# Patient Record
Sex: FEMALE | Race: WHITE | Employment: OTHER | URBAN - METROPOLITAN AREA
[De-identification: names, ages, dates, MRNs, and addresses within clinical notes are randomized per-mention and may not be internally consistent; named-entity substitution may affect disease eponyms.]

---

## 2017-02-16 ENCOUNTER — APPOINTMENT (OUTPATIENT)
Dept: GENERAL RADIOLOGY | Age: 82
End: 2017-02-16
Attending: EMERGENCY MEDICINE
Payer: MEDICARE

## 2017-02-16 ENCOUNTER — HOSPITAL ENCOUNTER (EMERGENCY)
Age: 82
Discharge: HOME | End: 2017-02-16
Attending: EMERGENCY MEDICINE | Admitting: EMERGENCY MEDICINE
Payer: MEDICARE

## 2017-02-16 VITALS
DIASTOLIC BLOOD PRESSURE: 61 MMHG | SYSTOLIC BLOOD PRESSURE: 129 MMHG | OXYGEN SATURATION: 94 % | BODY MASS INDEX: 27.42 KG/M2 | RESPIRATION RATE: 19 BRPM | TEMPERATURE: 98.7 F | WEIGHT: 154.76 LBS | HEART RATE: 77 BPM

## 2017-02-16 DIAGNOSIS — J18.9 CAP (COMMUNITY ACQUIRED PNEUMONIA): Primary | ICD-10-CM

## 2017-02-16 DIAGNOSIS — R55 SYNCOPE AND COLLAPSE: ICD-10-CM

## 2017-02-16 LAB
ALBUMIN SERPL BCP-MCNC: 3.2 G/DL (ref 3.4–5)
ALBUMIN/GLOB SERPL: 0.8 {RATIO} (ref 0.8–1.7)
ALP SERPL-CCNC: 92 U/L (ref 45–117)
ALT SERPL-CCNC: 23 U/L (ref 13–56)
ANION GAP BLD CALC-SCNC: 12 MMOL/L (ref 3–18)
AST SERPL W P-5'-P-CCNC: 36 U/L (ref 15–37)
ATRIAL RATE: 79 BPM
BASOPHILS # BLD AUTO: 0 K/UL (ref 0–0.1)
BASOPHILS # BLD: 0 % (ref 0–2)
BILIRUB SERPL-MCNC: 1.6 MG/DL (ref 0.2–1)
BUN SERPL-MCNC: 18 MG/DL (ref 7–18)
BUN/CREAT SERPL: 18 (ref 12–20)
CALCIUM SERPL-MCNC: 8.7 MG/DL (ref 8.5–10.1)
CALCULATED P AXIS, ECG09: 67 DEGREES
CALCULATED R AXIS, ECG10: -60 DEGREES
CALCULATED T AXIS, ECG11: -30 DEGREES
CHLORIDE SERPL-SCNC: 97 MMOL/L (ref 100–108)
CK MB CFR SERPL CALC: 0.9 % (ref 0–4)
CK MB SERPL-MCNC: 2.2 NG/ML (ref 0.5–3.6)
CK SERPL-CCNC: 249 U/L (ref 26–192)
CO2 SERPL-SCNC: 27 MMOL/L (ref 21–32)
CREAT SERPL-MCNC: 0.99 MG/DL (ref 0.6–1.3)
DIAGNOSIS, 93000: NORMAL
DIFFERENTIAL METHOD BLD: ABNORMAL
EOSINOPHIL # BLD: 0 K/UL (ref 0–0.4)
EOSINOPHIL NFR BLD: 0 % (ref 0–5)
ERYTHROCYTE [DISTWIDTH] IN BLOOD BY AUTOMATED COUNT: 13 % (ref 11.6–14.5)
GLOBULIN SER CALC-MCNC: 3.8 G/DL (ref 2–4)
GLUCOSE SERPL-MCNC: 120 MG/DL (ref 74–99)
HCT VFR BLD AUTO: 39.8 % (ref 35–45)
HGB BLD-MCNC: 13.7 G/DL (ref 12–16)
LYMPHOCYTES # BLD AUTO: 12 % (ref 21–52)
LYMPHOCYTES # BLD: 1.4 K/UL (ref 0.9–3.6)
MCH RBC QN AUTO: 31.9 PG (ref 24–34)
MCHC RBC AUTO-ENTMCNC: 34.4 G/DL (ref 31–37)
MCV RBC AUTO: 92.6 FL (ref 74–97)
MONOCYTES # BLD: 0.7 K/UL (ref 0.05–1.2)
MONOCYTES NFR BLD AUTO: 6 % (ref 3–10)
NEUTS SEG # BLD: 9.7 K/UL (ref 1.8–8)
NEUTS SEG NFR BLD AUTO: 82 % (ref 40–73)
P-R INTERVAL, ECG05: 156 MS
PLATELET # BLD AUTO: 181 K/UL (ref 135–420)
PMV BLD AUTO: 10 FL (ref 9.2–11.8)
POTASSIUM SERPL-SCNC: 3.3 MMOL/L (ref 3.5–5.5)
PROT SERPL-MCNC: 7 G/DL (ref 6.4–8.2)
Q-T INTERVAL, ECG07: 402 MS
QRS DURATION, ECG06: 118 MS
QTC CALCULATION (BEZET), ECG08: 460 MS
RBC # BLD AUTO: 4.3 M/UL (ref 4.2–5.3)
SODIUM SERPL-SCNC: 136 MMOL/L (ref 136–145)
TROPONIN I SERPL-MCNC: <0.02 NG/ML (ref 0–0.04)
VENTRICULAR RATE, ECG03: 79 BPM
WBC # BLD AUTO: 11.9 K/UL (ref 4.6–13.2)

## 2017-02-16 PROCEDURE — 82550 ASSAY OF CK (CPK): CPT | Performed by: EMERGENCY MEDICINE

## 2017-02-16 PROCEDURE — 85025 COMPLETE CBC W/AUTO DIFF WBC: CPT | Performed by: EMERGENCY MEDICINE

## 2017-02-16 PROCEDURE — 74011250637 HC RX REV CODE- 250/637: Performed by: EMERGENCY MEDICINE

## 2017-02-16 PROCEDURE — 74011250636 HC RX REV CODE- 250/636: Performed by: EMERGENCY MEDICINE

## 2017-02-16 PROCEDURE — 80053 COMPREHEN METABOLIC PANEL: CPT | Performed by: EMERGENCY MEDICINE

## 2017-02-16 PROCEDURE — 96361 HYDRATE IV INFUSION ADD-ON: CPT

## 2017-02-16 PROCEDURE — 96360 HYDRATION IV INFUSION INIT: CPT

## 2017-02-16 PROCEDURE — 93005 ELECTROCARDIOGRAM TRACING: CPT

## 2017-02-16 PROCEDURE — 71010 XR CHEST PORT: CPT

## 2017-02-16 PROCEDURE — 99285 EMERGENCY DEPT VISIT HI MDM: CPT

## 2017-02-16 RX ORDER — AZITHROMYCIN 250 MG/1
TABLET, FILM COATED ORAL
Qty: 4 TAB | Refills: 0 | Status: SHIPPED | OUTPATIENT
Start: 2017-02-16 | End: 2017-12-12 | Stop reason: ALTCHOICE

## 2017-02-16 RX ORDER — SODIUM CHLORIDE 9 MG/ML
1000 INJECTION, SOLUTION INTRAVENOUS ONCE
Status: COMPLETED | OUTPATIENT
Start: 2017-02-16 | End: 2017-02-16

## 2017-02-16 RX ORDER — AZITHROMYCIN 250 MG/1
500 TABLET, FILM COATED ORAL
Status: COMPLETED | OUTPATIENT
Start: 2017-02-16 | End: 2017-02-16

## 2017-02-16 RX ADMIN — AZITHROMYCIN 500 MG: 250 TABLET, FILM COATED ORAL at 16:05

## 2017-02-16 RX ADMIN — SODIUM CHLORIDE 1000 ML: 9 INJECTION, SOLUTION INTRAVENOUS at 14:10

## 2017-02-16 NOTE — ED NOTES
Discharge Instructions reviewed with patient/family. Patient/family  states understanding. . Condition stable. Opportunity for questions provided. E-sign not available  Hard copy instructions signed.    Pt states verbal understanding of instructions

## 2017-02-16 NOTE — DISCHARGE INSTRUCTIONS
Pneumonia: Care Instructions  Your Care Instructions    Pneumonia is an infection of the lungs. Most cases are caused by infections from bacteria or viruses. Pneumonia may be mild or very severe. If it is caused by bacteria, you will be treated with antibiotics. It may take a few weeks to a few months to recover fully from pneumonia, depending on how sick you were and whether your overall health is good. Follow-up care is a key part of your treatment and safety. Be sure to make and go to all appointments, and call your doctor if you are having problems. Its also a good idea to know your test results and keep a list of the medicines you take. How can you care for yourself at home? · Take your antibiotics exactly as directed. Do not stop taking the medicine just because you are feeling better. You need to take the full course of antibiotics. · Take your medicines exactly as prescribed. Call your doctor if you think you are having a problem with your medicine. · Get plenty of rest and sleep. You may feel weak and tired for a while, but your energy level will improve with time. · To prevent dehydration, drink plenty of fluids, enough so that your urine is light yellow or clear like water. Choose water and other caffeine-free clear liquids until you feel better. If you have kidney, heart, or liver disease and have to limit fluids, talk with your doctor before you increase the amount of fluids you drink. · Take care of your cough so you can rest. A cough that brings up mucus from your lungs is common with pneumonia. It is one way your body gets rid of the infection. But if coughing keeps you from resting or causes severe fatigue and chest-wall pain, talk to your doctor. He or she may suggest that you take a medicine to reduce the cough. · Use a vaporizer or humidifier to add moisture to your bedroom. Follow the directions for cleaning the machine. · Do not smoke or allow others to smoke around you.  Smoke will make your cough last longer. If you need help quitting, talk to your doctor about stop-smoking programs and medicines. These can increase your chances of quitting for good. · Take an over-the-counter pain medicine, such as acetaminophen (Tylenol), ibuprofen (Advil, Motrin), or naproxen (Aleve). Read and follow all instructions on the label. · Do not take two or more pain medicines at the same time unless the doctor told you to. Many pain medicines have acetaminophen, which is Tylenol. Too much acetaminophen (Tylenol) can be harmful. · If you were given a spirometer to measure how well your lungs are working, use it as instructed. This can help your doctor tell how your recovery is going. · To prevent pneumonia in the future, talk to your doctor about getting a flu vaccine (once a year) and a pneumococcal vaccine (one time only for most people). When should you call for help? Call 911 anytime you think you may need emergency care. For example, call if:  · You have severe trouble breathing. Call your doctor now or seek immediate medical care if:  · You cough up dark brown or bloody mucus (sputum). · You have new or worse trouble breathing. · You are dizzy or lightheaded, or you feel like you may faint. Watch closely for changes in your health, and be sure to contact your doctor if:  · You have a new or higher fever. · You are coughing more deeply or more often. · You are not getting better after 2 days (48 hours). · You do not get better as expected. Where can you learn more? Go to http://jonnathan-maris.info/. Enter [id number] in the search box to learn more about \"Pneumonia: Care Instructions. \"  Current as of: May 23, 2016  Content Version: 11.1  © 3719-8477 [employer]. Care instructions adapted under license by [employer] (which disclaims liability or warranty for this information).  If you have questions about a medical condition or this instruction, always ask your healthcare professional. [place] [address] any warranty or liability for your use of this information. Fainting: Care Instructions  Your Care Instructions    When you faint, or pass out, you lose consciousness for a short time. A brief drop in blood flow to the brain often causes it. When you fall or lie down, more blood flows to your brain and you regain consciousness. Emotional stress, pain, or overheating--especially if you have been standing--can make you faint. In these cases, fainting is usually not serious. But fainting can be a sign of a more serious problem. Your doctor may want you to have more tests to rule out other causes. The treatment you need depends on the reason why you fainted. The doctor has checked you carefully, but problems can develop later. If you notice any problems or new symptoms, get medical treatment right away. Follow-up care is a key part of your treatment and safety. Be sure to make and go to all appointments, and call your doctor if you are having problems. It's also a good idea to know your test results and keep a list of the medicines you take. How can you care for yourself at home? · Drink plenty of fluids to prevent dehydration. If you have kidney, heart, or liver disease and have to limit fluids, talk with your doctor before you increase your fluid intake. When should you call for help? Call 911 anytime you think you may need emergency care. For example, call if:  · You have symptoms of a heart problem. These may include:  ¨ Chest pain or pressure. ¨ Severe trouble breathing. ¨ A fast or irregular heartbeat. ¨ Lightheadedness or sudden weakness. ¨ Coughing up pink, foamy mucus. ¨ Passing out. After you call 911, the  may tell you to chew 1 adult-strength or 2 to 4 low-dose aspirin. Wait for an ambulance. Do not try to drive yourself. · You have symptoms of a stroke.  These may include:  ¨ Sudden numbness, tingling, weakness, or loss of movement in your face, arm, or leg, especially on only one side of your body. ¨ Sudden vision changes. ¨ Sudden trouble speaking. ¨ Sudden confusion or trouble understanding simple statements. ¨ Sudden problems with walking or balance. ¨ A sudden, severe headache that is different from past headaches. · You passed out (lost consciousness) again. Watch closely for changes in your health, and be sure to contact your doctor if:  · You do not get better as expected. Where can you learn more? Go to http://jonnathan-maris.info/. Enter V192 in the search box to learn more about \"Fainting: Care Instructions. \"  Current as of: May 27, 2016  Content Version: 11.1  © 4961-0908 [employer]. Care instructions adapted under license by [employer] (which disclaims liability or warranty for this information). If you have questions about a medical condition or this instruction, always ask your healthcare professional. [place] [address] any warranty or liability for your use of this information.

## 2017-02-16 NOTE — ED TRIAGE NOTES
Syncope in waiting room of dr office. A & o upon ems arrival, vomit x 2. At dr Irina Ro for chest cold. Dizzyness.

## 2017-02-16 NOTE — ED PROVIDER NOTES
HPI Comments: 79 y/o female presents after syncopal episode. Family reports pt had cough and cold sxs sat and Sunday, then developed severe diarrhea on Monday. Pt has continued to feel \"ill\" and has cough. Was waiting to see PCP today when pt had syncopal episode while sitting. Denies any complaints at this time. Patient is a 80 y.o. female presenting with syncope. Syncope   Pertinent negatives include no abdominal pain and no shortness of breath. Past Medical History:   Diagnosis Date    Atrial arrhythmia      palpitation    Cancer ([facility] [address]      cervical    Echocardiogram 10/01/2014     EF 60%. No WMA. Mild LVH. Indeterminate diastolic fx. RVSP 40 mmHg. Mild LAE. Mild MR.  Mild-mod TR.      Holter monitor study 10/01/2014     24-hr Holter. Sinus rhythm w/change in conduction, avg HR 65 bpm (range 50-96 bpm). Single SVEs - 2590. Four runs of SVT, max 4 beats.  HTN (hypertension)     Joint pain     Palpitations     Pulmonary embolism ([facility] [address] 2002    Wears eyeglasses        No past surgical history on file. No family history on file. Social History     Social History    Marital status:      Spouse name: N/A    Number of children: N/A    Years of education: N/A     Occupational History    Not on file. Social History Main Topics    Smoking status: Never Smoker    Smokeless tobacco: Never Used    Alcohol use No    Drug use: No    Sexual activity: Not on file     Other Topics Concern    Not on file     Social History Narrative         ALLERGIES: Review of patient's allergies indicates no known allergies. Review of Systems   Constitutional: Negative for chills and fever. Respiratory: Positive for cough. Negative for shortness of breath. Cardiovascular: Positive for syncope. Gastrointestinal: Positive for diarrhea, nausea and vomiting. Negative for abdominal pain. Neurological: Positive for syncope.    All other systems reviewed and are negative. Vitals:    02/16/17 1530 02/16/17 1545 02/16/17 1607 02/16/17 1615   BP: 143/58 135/53 116/80 129/61   Pulse: 84 76 85 77   Resp: 18 21 21 19   Temp:       SpO2: 96% 92% 94% 94%   Weight:                Physical Exam   Constitutional: She is oriented to person, place, and time. She appears well-developed and well-nourished. HENT:   Head: Normocephalic and atraumatic. Neck: Neck supple. No JVD present. Cardiovascular: Normal rate and regular rhythm. Pulmonary/Chest: Effort normal. No respiratory distress. Coarse rhonchi with expiratory wheeze   Abdominal: Soft. She exhibits no distension. There is no tenderness. There is no rebound and no guarding. Musculoskeletal: She exhibits no edema. Neurological: She is alert and oriented to person, place, and time. Skin: Skin is warm and dry. No erythema.    Psychiatric: Judgment normal.        MDM  Number of Diagnoses or Management Options  CAP (community acquired pneumonia):   Syncope and collapse:   Diagnosis management comments: 81 y/o female presents after syncopal episode  Check basic labs, ekg  Appears dehydrated, will give NS bolus  Screen for pna  No chest pain or sxs of acs  No PE risk factors       Amount and/or Complexity of Data Reviewed  Clinical lab tests: ordered and reviewed  Tests in the medicine section of CPT®: reviewed and ordered      ED Course       Procedures    Patient Vitals for the past 12 hrs:   Temp Pulse Resp BP SpO2   02/16/17 1615 - 77 19 129/61 94 %   02/16/17 1607 - 85 21 116/80 94 %   02/16/17 1545 - 76 21 135/53 92 %   02/16/17 1530 - 84 18 143/58 96 %   02/16/17 1515 - 84 19 130/67 95 %   02/16/17 1500 - 81 21 122/62 95 %   02/16/17 1445 - 80 20 128/50 93 %   02/16/17 1430 - 78 20 - 95 %   02/16/17 1415 - 86 23 - 95 %   02/16/17 1400 - 80 23 123/75 97 %   02/16/17 1351 - - - 117/61 -   02/16/17 1348 98.7 °F (37.1 °C) 83 21 - 95 %   02/16/17 1345 - 80 15 - 90 %      Medications ordered:   Medications   0.9% sodium chloride infusion 1,000 mL (0 mL IntraVENous IV Completed 2/16/17 2243)   azithromycin (ZITHROMAX) tablet 500 mg (500 mg Oral Given 2/16/17 1605)      Lab findings:   Recent Results (from the past 12 hour(s))   EKG, 12 LEAD, INITIAL    Collection Time: 02/16/17  1:46 PM   Result Value Ref Range    Ventricular Rate 79 BPM    Atrial Rate 79 BPM    P-R Interval 156 ms    QRS Duration 118 ms    Q-T Interval 402 ms    QTC Calculation (Bezet) 460 ms    Calculated P Axis 67 degrees    Calculated R Axis -60 degrees    Calculated T Axis -30 degrees    Diagnosis       Sinus rhythm with premature atrial complexes  Right bundle branch block  Left anterior fascicular block  Bifascicular block  Abnormal ECG  No previous ECGs available     CBC WITH AUTOMATED DIFF    Collection Time: 02/16/17  1:52 PM   Result Value Ref Range    WBC 11.9 4.6 - 13.2 K/uL    RBC 4.30 4.20 - 5.30 M/uL    HGB 13.7 12.0 - 16.0 g/dL    HCT 39.8 35.0 - 45.0 %    MCV 92.6 74.0 - 97.0 FL    MCH 31.9 24.0 - 34.0 PG    MCHC 34.4 31.0 - 37.0 g/dL    RDW 13.0 11.6 - 14.5 %    PLATELET 506 369 - 817 K/uL    MPV 10.0 9.2 - 11.8 FL    NEUTROPHILS 82 (H) 40 - 73 %    LYMPHOCYTES 12 (L) 21 - 52 %    MONOCYTES 6 3 - 10 %    EOSINOPHILS 0 0 - 5 %    BASOPHILS 0 0 - 2 %    ABS. NEUTROPHILS 9.7 (H) 1.8 - 8.0 K/UL    ABS. LYMPHOCYTES 1.4 0.9 - 3.6 K/UL    ABS. MONOCYTES 0.7 0.05 - 1.2 K/UL    ABS. EOSINOPHILS 0.0 0.0 - 0.4 K/UL    ABS.  BASOPHILS 0.0 0.0 - 0.1 K/UL    DF AUTOMATED     CARDIAC PANEL,(CK, CKMB & TROPONIN)    Collection Time: 02/16/17  1:52 PM   Result Value Ref Range     (H) 26 - 192 U/L    CK - MB 2.2 0.5 - 3.6 ng/ml    CK-MB Index 0.9 0.0 - 4.0 %    Troponin-I, Qt. <0.02 0.0 - 0.396 NG/ML   METABOLIC PANEL, COMPREHENSIVE    Collection Time: 02/16/17  1:52 PM   Result Value Ref Range    Sodium 136 136 - 145 mmol/L    Potassium 3.3 (L) 3.5 - 5.5 mmol/L    Chloride 97 (L) 100 - 108 mmol/L    CO2 27 21 - 32 mmol/L    Anion gap 12 3.0 - 18 mmol/L Glucose 120 (H) 74 - 99 mg/dL    BUN 18 7.0 - 18 MG/DL    Creatinine 0.99 0.6 - 1.3 MG/DL    BUN/Creatinine ratio 18 12 - 20      GFR est AA >60 >60 ml/min/1.73m2    GFR est non-AA 53 (L) >60 ml/min/1.73m2    Calcium 8.7 8.5 - 10.1 MG/DL    Bilirubin, total 1.6 (H) 0.2 - 1.0 MG/DL    ALT (SGPT) 23 13 - 56 U/L    AST (SGOT) 36 15 - 37 U/L    Alk. phosphatase 92 45 - 117 U/L    Protein, total 7.0 6.4 - 8.2 g/dL    Albumin 3.2 (L) 3.4 - 5.0 g/dL    Globulin 3.8 2.0 - 4.0 g/dL    A-G Ratio 0.8 0.8 - 1.7        EKG interpretation: 1346, rate 79, left axis, RBBB, no ST changes    X-Ray, CT or other radiology findings or impressions:   XR CHEST PORT   Final Result       No radiographic evidence of an acute cardiopulmonary process. Multiple chronic appearing changes, as described above.     Thank you for this referral.     Progress and Consult notes:   Pt feeling well, improved with IVF. Due to cough and coarse BS and elevated wbc will treat with abx. Stable for dc, discussed return precautions with pt. Diagnosis:   1. CAP (community acquired pneumonia)    2.  Syncope and collapse       Disposition: discharged  Follow-up Information     Follow up With Details Comments [address] Whitten MD Schedule an appointment as soon as possible for a visit in 4 days  [place] [address] [facility] EMERGENCY DEPT Go to As needed, If symptoms worsen [address] [place] [id number] [phone]

## 2017-07-22 ENCOUNTER — HOSPITAL ENCOUNTER (OUTPATIENT)
Dept: LAB | Age: 82
Discharge: HOME | End: 2017-07-22
Payer: MEDICARE

## 2017-07-22 DIAGNOSIS — H81.13 BENIGN PAROXYSMAL VERTIGO OF BOTH EARS: ICD-10-CM

## 2017-07-22 LAB
BUN SERPL-MCNC: 19 MG/DL (ref 7–18)
CREAT SERPL-MCNC: 0.72 MG/DL (ref 0.6–1.3)

## 2017-07-22 PROCEDURE — 36415 COLL VENOUS BLD VENIPUNCTURE: CPT | Performed by: OTOLARYNGOLOGY

## 2017-07-22 PROCEDURE — 84520 ASSAY OF UREA NITROGEN: CPT | Performed by: OTOLARYNGOLOGY

## 2017-07-22 PROCEDURE — 82565 ASSAY OF CREATININE: CPT | Performed by: OTOLARYNGOLOGY

## 2017-07-23 ENCOUNTER — HOSPITAL ENCOUNTER (OUTPATIENT)
Age: 82
Discharge: HOME | End: 2017-07-23
Attending: OTOLARYNGOLOGY
Payer: MEDICARE

## 2017-07-23 PROCEDURE — 74011250636 HC RX REV CODE- 250/636: Performed by: OTOLARYNGOLOGY

## 2017-07-23 PROCEDURE — 70553 MRI BRAIN STEM W/O & W/DYE: CPT

## 2017-07-23 PROCEDURE — A9585 GADOBUTROL INJECTION: HCPCS | Performed by: OTOLARYNGOLOGY

## 2017-07-23 RX ADMIN — GADOBUTROL 6.5 ML: 604.72 INJECTION INTRAVENOUS at 10:00

## 2017-08-31 ENCOUNTER — HOSPITAL ENCOUNTER (OUTPATIENT)
Dept: LAB | Age: 82
Discharge: HOME | End: 2017-08-31
Payer: MEDICARE

## 2017-08-31 PROCEDURE — 82785 ASSAY OF IGE: CPT | Performed by: OTOLARYNGOLOGY

## 2017-08-31 PROCEDURE — 36415 COLL VENOUS BLD VENIPUNCTURE: CPT | Performed by: OTOLARYNGOLOGY

## 2017-09-01 LAB — IGE SERPL-ACNC: 56 IU/ML (ref 0–100)

## 2017-12-12 ENCOUNTER — OFFICE VISIT (OUTPATIENT)
Dept: CARDIOLOGY CLINIC | Age: 82
End: 2017-12-12

## 2017-12-12 VITALS
OXYGEN SATURATION: 95 % | DIASTOLIC BLOOD PRESSURE: 84 MMHG | HEART RATE: 63 BPM | WEIGHT: 147 LBS | BODY MASS INDEX: 26.05 KG/M2 | HEIGHT: 63 IN | SYSTOLIC BLOOD PRESSURE: 142 MMHG

## 2017-12-12 DIAGNOSIS — I10 ESSENTIAL HYPERTENSION: ICD-10-CM

## 2017-12-12 DIAGNOSIS — I49.8 SINUS ARRHYTHMIA: Primary | ICD-10-CM

## 2017-12-12 DIAGNOSIS — I45.10 RBBB: ICD-10-CM

## 2017-12-12 DIAGNOSIS — R00.2 PALPITATIONS: ICD-10-CM

## 2017-12-12 NOTE — MR AVS SNAPSHOT
Visit Information Date & Time Provider Department Dept. Phone Encounter #  
 12/12/2017  9:00 AM Tracy Holt MD [facility] [address] [id number] Upcoming Health Maintenance Date Due DTaP/Tdap/Td series (1 - Tdap) 10/6/1951 ZOSTER VACCINE AGE 60> 8/6/1990 GLAUCOMA SCREENING Q2Y 10/6/1995 Pneumococcal 65+ Low/Medium Risk (1 of 2 - PCV13) 10/6/1995 MEDICARE YEARLY EXAM 10/6/1995 Influenza Age 5 to Adult 8/1/2017 Allergies as of 12/12/2017  Review Complete On: 12/12/2017 By: Tracy Holt MD  
 No Known Allergies Current Immunizations  Never Reviewed No immunizations on file. Not reviewed this visit You Were Diagnosed With   
  
 Codes Comments Sinus arrhythmia    -  Primary ICD-10-CM: I49.9 ICD-9-CM: 427.9 RBBB     ICD-10-CM: I45.10 ICD-9-CM: 426.4 Essential hypertension     ICD-10-CM: I10 
ICD-9-CM: 401.9 Palpitations     ICD-10-CM: R00.2 ICD-9-CM: 785.1 Vitals BP Pulse Height(growth percentile) Weight(growth percentile) SpO2 BMI  
 142/84 63 5' 3\" (1.6 m) 147 lb (66.7 kg) 95% 26.04 kg/m2 Smoking Status Never Smoker Vitals History BMI and BSA Data Body Mass Index Body Surface Area 26.04 kg/m 2 1.72 m 2 Your Updated Medication List  
  
   
This list is accurate as of: 12/12/17  9:34 AM.  Always use your most recent med list.  
  
  
  
  
 ASPIRIN LOW DOSE 81 mg tablet Generic drug:  aspirin delayed-release Take  by mouth daily. HYZAAR 50-12.5 mg per tablet Generic drug:  losartan-hydroCHLOROthiazide Take 1 Tab by mouth daily. multivitamin tablet Commonly known as:  ONE A DAY Take 1 Tab by mouth daily. TOPROL  mg tablet Generic drug:  metoprolol succinate Take  by mouth daily. ZOCOR 40 mg tablet Generic drug:  simvastatin Take 40 mg by mouth nightly. We Performed the Following AMB POC EKG ROUTINE W/ 12 LEADS, INTER & REP [54744 CPT(R)] Introducing [facility]! Sonia Campuzano introduces [employer] patient portal. Now you can access parts of your medical record, email your doctor's office, and request medication refills online. 1. In your internet browser, go to [email or web address] 2. Click on the First Time User? Click Here link in the Sign In box. You will see the New Member Sign Up page. 3. Enter your [employer] Access Code exactly as it appears below. You will not need to use this code after youve completed the sign-up process. If you do not sign up before the expiration date, you must request a new code. · [employer] Access Code: [email or web address] Expires: 3/12/2018  8:46 AM 
 
4. Enter the last four digits of your Social Security Number (xxxx) and Date of Birth (mm/dd/yyyy) as indicated and click Submit. You will be taken to the next sign-up page. 5. Create a [employer] ID. This will be your [employer] login ID and cannot be changed, so think of one that is secure and easy to remember. 6. Create a [employer] password. You can change your password at any time. 7. Enter your Password Reset Question and Answer. This can be used at a later time if you forget your password. 8. Enter your e-mail address. You will receive e-mail notification when new information is available in [address]. 9. Click Sign Up. You can now view and download portions of your medical record. 10. Click the Download Summary menu link to download a portable copy of your medical information. If you have questions, please visit the Frequently Asked Questions section of the [employer] website. Remember, [employer] is NOT to be used for urgent needs. For medical emergencies, dial 911. Now available from your iPhone and Android! Please provide this summary of care documentation to your next provider. Your primary care clinician is listed as Sidra Linder  If you have any questions after today's visit, please call [phone].

## 2017-12-12 NOTE — PROGRESS NOTES
1. Have you been to the ER, urgent care clinic since your last visit? Hospitalized since your last visit? Yes, 2/16/17 for syncope     2. Have you seen or consulted any other health care providers outside of the [facility] since your last visit? Include any pap smears or colon screening.  No

## 2017-12-12 NOTE — PROGRESS NOTES
HISTORY OF PRESENT ILLNESS  Moni Covarrubias is a 80 y.o. female. ASSESSMENT and PLAN    Ms. Moni Covarrubias has history of hypertension and palpitations. she has no complaints of or history of mahendra syncope. She has irregular heartbeats. Her Holter monitor obtained in October of 2014 showed mainly sinus rhythm, with arrhythmia. There were no significant ventricular tachycardic events. There were PVC's and limited SVT's. In February 2017, she presented to the emergency room with dizziness and syncope. She was noted to be dehydrated. Since that time, she has not had any further episodes. For her orthostatic dizziness, I did advise her to be careful and take her time.  CAD:   She has no documented history of CAD.  BP:   Controlled.  HR:    Stable; she does have mild sinus arrhythmia which is asymptomatic. She has chronic RBBB.  CHF:   There is no evidence of decompensated CHF noted.  Weight:   Her weight today is 147 pounds. Her baseline weight is 145-150 pounds.  Cholesterol:   Target LDL <90. She remains on Zocor.  Anti-platelet:   Remains on ASA. I would continue her current cardiac regimen. I will see her back annually. Thank you. Encounter Diagnoses   Name Primary?  Sinus arrhythmia Yes    RBBB     Essential hypertension     Palpitations      current treatment plan is effective, no change in therapy  lab results and schedule of future lab studies reviewed with patient  reviewed diet, exercise and weight control  cardiovascular risk and specific lipid/LDL goals reviewed  use of aspirin to prevent MI and TIA's discussed      HPI  Today, Ms. Maryanne Baires has no complaints of chest pains, increased shortness of breath or decreased exercise capacity. She denies any orthopnea or PND. She denies any palpitations or dizziness. She does have orthostatic dizziness. Review of Systems   Respiratory: Negative for shortness of breath.     Cardiovascular: Negative for chest pain, palpitations, orthopnea, claudication, leg swelling and PND. Neurological: Positive for dizziness. All other systems reviewed and are negative. Physical Exam   Constitutional: She is oriented to person, place, and time. She appears well-developed and well-nourished. HENT:   Head: Normocephalic. Eyes: Conjunctivae are normal.   Neck: Neck supple. No JVD present. Carotid bruit is not present. No thyromegaly present. Cardiovascular: Normal rate. An irregular rhythm present. Pulmonary/Chest: Breath sounds normal.   Abdominal: Bowel sounds are normal.   Musculoskeletal: She exhibits no edema. Neurological: She is alert and oriented to person, place, and time. Skin: Skin is warm and dry. Nursing note and vitals reviewed. PCP: Roselyn Delatorre MD    Past Medical History:   Diagnosis Date    Atrial arrhythmia     palpitation    Cancer ([facility] [address]     cervical    Echocardiogram 10/01/2014    EF 60%. No WMA. Mild LVH. Indeterminate diastolic fx. RVSP 40 mmHg. Mild LAE. Mild MR.  Mild-mod TR.      Holter monitor study 10/01/2014    24-hr Holter. Sinus rhythm w/change in conduction, avg HR 65 bpm (range 50-96 bpm). Single SVEs - 2590. Four runs of SVT, max 4 beats.  HTN (hypertension)     Joint pain     Palpitations     Pulmonary embolism ([facility] [address] 2002    Wears eyeglasses        History reviewed. No pertinent surgical history. Current Outpatient Prescriptions   Medication Sig Dispense Refill    losartan-hydroCHLOROthiazide (HYZAAR) 50-12.5 mg per tablet Take 1 Tab by mouth daily.  multivitamin (ONE A DAY) tablet Take 1 Tab by mouth daily.  simvastatin (ZOCOR) 40 mg tablet Take 40 mg by mouth nightly.  metoprolol-XL (TOPROL XL) 100 mg XL tablet Take  by mouth daily.  aspirin delayed-release (ASPIRIN LOW DOSE) 81 mg tablet Take  by mouth daily.          The patient has a family history of    Social History   Substance Use Topics    Smoking status: Never Smoker    Smokeless tobacco: Never Used    Alcohol use No       No results found for: CHOL, CHOLX, CHLST, CHOLV, HDL, LDL, LDLC, DLDLP, TGLX, TRIGL, TRIGP, CHHD, CHHDX     BP Readings from Last 3 Encounters:   12/12/17 142/84   02/16/17 129/61   12/06/16 144/86        Pulse Readings from Last 3 Encounters:   12/12/17 63   02/16/17 77   12/06/16 76       Wt Readings from Last 3 Encounters:   12/12/17 66.7 kg (147 lb)   07/23/17 64.4 kg (142 lb)   02/16/17 70.2 kg (154 lb 12.2 oz)         EKG: unchanged from previous tracings, RBBB, sinus arrhythmia.

## 2019-02-19 ENCOUNTER — OFFICE VISIT (OUTPATIENT)
Dept: CARDIOLOGY CLINIC | Age: 84
End: 2019-02-19

## 2019-02-19 VITALS
DIASTOLIC BLOOD PRESSURE: 78 MMHG | WEIGHT: 150 LBS | OXYGEN SATURATION: 97 % | SYSTOLIC BLOOD PRESSURE: 144 MMHG | BODY MASS INDEX: 26.58 KG/M2 | HEART RATE: 81 BPM | HEIGHT: 63 IN

## 2019-02-19 DIAGNOSIS — I45.10 RBBB: Primary | ICD-10-CM

## 2019-02-19 NOTE — PROGRESS NOTES
HISTORY OF PRESENT ILLNESS Giovanny Mendoza is a 80 y.o. female. ASSESSMENT and PLAN Ms. Giovanny Mendoza has history of hypertension and palpitations. she has no complaints of or history of mahendra syncope. She has irregular heartbeats. Her Holter monitor obtained in October of 2014 showed mainly sinus rhythm, with arrhythmia. There were no significant ventricular tachycardic events. There were PVC's and limited SVT's. In February 2017, she presented to the emergency room with dizziness and syncope. She was noted to be dehydrated. Since that time, she has not had any further episodes. For her orthostatic dizziness, I did advise her to be careful and take her time. ? CAD:   She has no documented history of CAD. ? BP:   Upper normal but acceptable. ? HR:    Stable. She is in sinus rhythm with mild dysrhythmia. She has chronic RBBB. ? CHF:   There is no evidence of decompensated CHF noted. ? Weight:   Her weight today is 150 pounds. This is her baseline. ? Cholesterol:   Target LDL <Zocor 40 daily. ? Anti-platelet:   Remains on ASA. She was accompanied by her daughter. All questions were answered. Her major issue appears to be difficulty hearing. I will see her back annually. Thank you. Encounter Diagnoses Name Primary?  RBBB Yes  
 
current treatment plan is effective, no change in therapy 
lab results and schedule of future lab studies reviewed with patient 
reviewed diet, exercise and weight control 
cardiovascular risk and specific lipid/LDL goals reviewed 
use of aspirin to prevent MI and TIA's discussed HPI Today, Ms. Cyndi Zapata has no complaints of chest pains, increased shortness of breath or decreased exercise capacity. Despite her age, she remains active physically. She is planning to restart her garden this spring. She denies any palpitations or dizziness. She denies any orthopnea or PND. As noted above, she was accompanied by her daughter.   It appears that her major issue is difficulty hearing. Review of Systems Respiratory: Negative for shortness of breath. Cardiovascular: Negative for chest pain, palpitations, orthopnea, claudication, leg swelling and PND. All other systems reviewed and are negative. Physical Exam  
Constitutional: She is oriented to person, place, and time. She appears well-developed and well-nourished. HENT:  
Head: Normocephalic. Eyes: Conjunctivae are normal.  
Neck: Neck supple. No JVD present. Carotid bruit is not present. No thyromegaly present. Cardiovascular: Normal rate and regular rhythm. Occasional extrasystoles are present. Pulmonary/Chest: Breath sounds normal.  
Abdominal: Bowel sounds are normal.  
Musculoskeletal: She exhibits no edema. Neurological: She is alert and oriented to person, place, and time. Skin: Skin is warm and dry. Nursing note and vitals reviewed. PCP: Weston Hunter MD 
 
Past Medical History:  
Diagnosis Date  Atrial arrhythmia   
 palpitation  Cancer ([facility] [address] cervical  
 Echocardiogram 10/01/2014 EF 60%. No WMA. Mild LVH. Indeterminate diastolic fx. RVSP 40 mmHg. Mild LAE. Mild MR.  Mild-mod TR.    
 Holter monitor study 10/01/2014 24-hr Holter. Sinus rhythm w/change in conduction, avg HR 65 bpm (range 50-96 bpm). Single SVEs - 2590. Four runs of SVT, max 4 beats.  HTN (hypertension)  Joint pain  Palpitations  Pulmonary embolism ([facility] [address] 2002  Wears eyeglasses History reviewed. No pertinent surgical history. Current Outpatient Medications Medication Sig Dispense Refill  losartan-hydroCHLOROthiazide (HYZAAR) 50-12.5 mg per tablet Take 1 Tab by mouth daily.  multivitamin (ONE A DAY) tablet Take 1 Tab by mouth daily.  simvastatin (ZOCOR) 40 mg tablet Take 40 mg by mouth nightly.  metoprolol-XL (TOPROL XL) 100 mg XL tablet Take  by mouth daily.  aspirin delayed-release (ASPIRIN LOW DOSE) 81 mg tablet Take  by mouth daily. The patient has a family history of 
 
Social History Tobacco Use  Smoking status: Never Smoker  Smokeless tobacco: Never Used Substance Use Topics  Alcohol use: No  
 Drug use: No  
 
 
No results found for: CHOL, CHOLX, CHLST, CHOLV, HDL, LDL, LDLC, DLDLP, TGLX, TRIGL, TRIGP, CHHD, CHHDX  
 
BP Readings from Last 3 Encounters:  
02/19/19 144/78  
12/12/17 142/84  
02/16/17 129/61 Pulse Readings from Last 3 Encounters:  
02/19/19 81  
12/12/17 63  
02/16/17 77 Wt Readings from Last 3 Encounters:  
02/19/19 68 kg (150 lb) 12/12/17 66.7 kg (147 lb)  
07/23/17 64.4 kg (142 lb) EKG: unchanged from previous tracings, sinus rhythm with frequent PACs, RBBB, low voltage.

## 2020-01-14 ENCOUNTER — APPOINTMENT (OUTPATIENT)
Dept: GENERAL RADIOLOGY | Age: 85
End: 2020-01-14
Attending: PHYSICIAN ASSISTANT
Payer: MEDICARE

## 2020-01-14 ENCOUNTER — APPOINTMENT (OUTPATIENT)
Dept: CT IMAGING | Age: 85
End: 2020-01-14
Attending: PHYSICIAN ASSISTANT
Payer: MEDICARE

## 2020-01-14 ENCOUNTER — HOSPITAL ENCOUNTER (EMERGENCY)
Age: 85
Discharge: HOME | End: 2020-01-14
Attending: EMERGENCY MEDICINE
Payer: MEDICARE

## 2020-01-14 VITALS
HEIGHT: 63 IN | OXYGEN SATURATION: 97 % | SYSTOLIC BLOOD PRESSURE: 130 MMHG | WEIGHT: 150 LBS | DIASTOLIC BLOOD PRESSURE: 75 MMHG | BODY MASS INDEX: 26.58 KG/M2 | RESPIRATION RATE: 14 BRPM | TEMPERATURE: 97 F | HEART RATE: 85 BPM

## 2020-01-14 DIAGNOSIS — N39.0 URINARY TRACT INFECTION WITHOUT HEMATURIA, SITE UNSPECIFIED: ICD-10-CM

## 2020-01-14 DIAGNOSIS — R55 SYNCOPE, UNSPECIFIED SYNCOPE TYPE: Primary | ICD-10-CM

## 2020-01-14 LAB
ANION GAP SERPL CALC-SCNC: 8 MMOL/L (ref 3–18)
APPEARANCE UR: ABNORMAL
ATRIAL RATE: 87 BPM
BACTERIA URNS QL MICRO: ABNORMAL /HPF
BASOPHILS # BLD: 0 K/UL (ref 0–0.1)
BASOPHILS NFR BLD: 0 % (ref 0–2)
BILIRUB UR QL: NEGATIVE
BUN SERPL-MCNC: 19 MG/DL (ref 7–18)
BUN/CREAT SERPL: 21 (ref 12–20)
CALCIUM SERPL-MCNC: 9.5 MG/DL (ref 8.5–10.1)
CALCULATED P AXIS, ECG09: 25 DEGREES
CALCULATED R AXIS, ECG10: -73 DEGREES
CALCULATED T AXIS, ECG11: -23 DEGREES
CHLORIDE SERPL-SCNC: 101 MMOL/L (ref 100–111)
CK MB CFR SERPL CALC: 2.4 % (ref 0–4)
CK MB SERPL-MCNC: 6.7 NG/ML (ref 5–25)
CK SERPL-CCNC: 285 U/L (ref 26–192)
CO2 SERPL-SCNC: 27 MMOL/L (ref 21–32)
COLOR UR: YELLOW
CREAT SERPL-MCNC: 0.89 MG/DL (ref 0.6–1.3)
DIAGNOSIS, 93000: NORMAL
DIFFERENTIAL METHOD BLD: ABNORMAL
EOSINOPHIL # BLD: 0 K/UL (ref 0–0.4)
EOSINOPHIL NFR BLD: 1 % (ref 0–5)
EPITH CASTS URNS QL MICRO: ABNORMAL /LPF (ref 0–5)
ERYTHROCYTE [DISTWIDTH] IN BLOOD BY AUTOMATED COUNT: 12.7 % (ref 11.6–14.5)
GLUCOSE SERPL-MCNC: 107 MG/DL (ref 74–99)
GLUCOSE UR STRIP.AUTO-MCNC: NEGATIVE MG/DL
HCT VFR BLD AUTO: 41.5 % (ref 35–45)
HGB BLD-MCNC: 14 G/DL (ref 12–16)
HGB UR QL STRIP: ABNORMAL
HYALINE CASTS URNS QL MICRO: ABNORMAL /LPF (ref 0–2)
KETONES UR QL STRIP.AUTO: 15 MG/DL
LEUKOCYTE ESTERASE UR QL STRIP.AUTO: ABNORMAL
LYMPHOCYTES # BLD: 0.8 K/UL (ref 0.9–3.6)
LYMPHOCYTES NFR BLD: 19 % (ref 21–52)
MCH RBC QN AUTO: 31.1 PG (ref 24–34)
MCHC RBC AUTO-ENTMCNC: 33.7 G/DL (ref 31–37)
MCV RBC AUTO: 92.2 FL (ref 74–97)
MONOCYTES # BLD: 0.3 K/UL (ref 0.05–1.2)
MONOCYTES NFR BLD: 7 % (ref 3–10)
MUCOUS THREADS URNS QL MICRO: ABNORMAL /LPF
NEUTS SEG # BLD: 3.3 K/UL (ref 1.8–8)
NEUTS SEG NFR BLD: 73 % (ref 40–73)
NITRITE UR QL STRIP.AUTO: NEGATIVE
P-R INTERVAL, ECG05: 154 MS
PH UR STRIP: 6.5 [PH] (ref 5–8)
PLATELET # BLD AUTO: 212 K/UL (ref 135–420)
PMV BLD AUTO: 10.3 FL (ref 9.2–11.8)
POTASSIUM SERPL-SCNC: 3.9 MMOL/L (ref 3.5–5.5)
PROT UR STRIP-MCNC: 30 MG/DL
Q-T INTERVAL, ECG07: 408 MS
QRS DURATION, ECG06: 120 MS
QTC CALCULATION (BEZET), ECG08: 470 MS
RBC # BLD AUTO: 4.5 M/UL (ref 4.2–5.3)
RBC #/AREA URNS HPF: ABNORMAL /HPF (ref 0–5)
SODIUM SERPL-SCNC: 136 MMOL/L (ref 136–145)
SP GR UR REFRACTOMETRY: 1.02 (ref 1–1.03)
TROPONIN I SERPL-MCNC: <0.02 NG/ML (ref 0–0.04)
UROBILINOGEN UR QL STRIP.AUTO: 1 EU/DL (ref 0.2–1)
VENTRICULAR RATE, ECG03: 80 BPM
WBC # BLD AUTO: 4.5 K/UL (ref 4.6–13.2)
WBC URNS QL MICRO: ABNORMAL /HPF (ref 0–4)

## 2020-01-14 PROCEDURE — 81001 URINALYSIS AUTO W/SCOPE: CPT

## 2020-01-14 PROCEDURE — 85025 COMPLETE CBC W/AUTO DIFF WBC: CPT

## 2020-01-14 PROCEDURE — 99283 EMERGENCY DEPT VISIT LOW MDM: CPT

## 2020-01-14 PROCEDURE — 82550 ASSAY OF CK (CPK): CPT

## 2020-01-14 PROCEDURE — 71046 X-RAY EXAM CHEST 2 VIEWS: CPT

## 2020-01-14 PROCEDURE — 80048 BASIC METABOLIC PNL TOTAL CA: CPT

## 2020-01-14 PROCEDURE — 87086 URINE CULTURE/COLONY COUNT: CPT

## 2020-01-14 PROCEDURE — 93005 ELECTROCARDIOGRAM TRACING: CPT

## 2020-01-14 PROCEDURE — 70450 CT HEAD/BRAIN W/O DYE: CPT

## 2020-01-14 RX ORDER — CEPHALEXIN 500 MG/1
500 CAPSULE ORAL 4 TIMES DAILY
Qty: 28 CAP | Refills: 0 | Status: SHIPPED | OUTPATIENT
Start: 2020-01-14 | End: 2020-01-21

## 2020-01-14 NOTE — ED TRIAGE NOTES
Per the patient's daughter, Omar Marc were on our way to get some lunch. I noticed that she was slumped over and her color was pale. Her face was looking funny. \"

## 2020-01-14 NOTE — ED PROVIDER NOTES
EMERGENCY DEPARTMENT HISTORY AND PHYSICAL EXAM    4:10 PM      Date: 1/14/2020  Patient Name: Tracy Velez    History of Presenting Illness     Chief Complaint   Patient presents with    Syncope         History Provided By: Patient and Patient's Daughter    Additional History (Context): Tracy Velez is a 80 y.o. female with hx of  PE, cardiac arrythmias, HTN, stroke who presents with daughter after passing out in the car earlier today around lunchtime. Per daughter they went to the doctor this morning for her routine visit once they left they went to get lunch on the way to lunch patient became pale and passed out in the car for a few seconds. Pt became alert a few seconds later. Pt report prior the episode she just felt weak and wanted to lay down. Pt denied having any chest pain, nausea, vomiting, or dizziness. Patient report she has some diarrhea during the weekend but that has resolved. Patient not having any abdominal pain, body aches, chills, diarrhea time. Patient reports she feels like her normal self. Patient denies having any headaches. PCP: Ciera Hernandez MD    Current Facility-Administered Medications   Medication Dose Route Frequency Provider Last Rate Last Dose    sodium chloride 0.9 % bolus infusion 1,000 mL  1,000 mL IntraVENous ONCE Mildred Bateman NP         Current Outpatient Medications   Medication Sig Dispense Refill    cephALEXin (KEFLEX) 500 mg capsule Take 1 Cap by mouth four (4) times daily for 7 days. 28 Cap 0    losartan-hydroCHLOROthiazide (HYZAAR) 50-12.5 mg per tablet Take 1 Tab by mouth daily.  multivitamin (ONE A DAY) tablet Take 1 Tab by mouth daily.  simvastatin (ZOCOR) 40 mg tablet Take 40 mg by mouth nightly.  metoprolol-XL (TOPROL XL) 100 mg XL tablet Take  by mouth daily.  aspirin delayed-release (ASPIRIN LOW DOSE) 81 mg tablet Take  by mouth daily.          Past History     Past Medical History:  Past Medical History:   Diagnosis Date    Atrial arrhythmia     palpitation    Cancer (HCC)     cervical    Echocardiogram 10/01/2014    EF 60%. No WMA. Mild LVH. Indeterminate diastolic fx. RVSP 40 mmHg. Mild LAE. Mild MR.  Mild-mod TR.      Holter monitor study 10/01/2014    24-hr Holter. Sinus rhythm w/change in conduction, avg HR 65 bpm (range 50-96 bpm). Single SVEs - 2590. Four runs of SVT, max 4 beats.  HTN (hypertension)     Joint pain     Palpitations     Pulmonary embolism ([address] 2002    Wears eyeglasses        Past Surgical History:  History reviewed. No pertinent surgical history. Family History:  History reviewed. No pertinent family history. Social History:  Social History     Tobacco Use    Smoking status: Never Smoker    Smokeless tobacco: Never Used   Substance Use Topics    Alcohol use: No    Drug use: No       Allergies:  No Known Allergies      Review of Systems       Review of Systems   Constitutional: Negative for chills and fever. Respiratory: Negative for shortness of breath. Cardiovascular: Negative for chest pain. Gastrointestinal: Negative for abdominal pain, nausea and vomiting. Skin: Negative for rash. Neurological: Negative for weakness. Syncope episode   All other systems reviewed and are negative. Physical Exam     Visit Vitals  /75 (BP 1 Location: Left arm, BP Patient Position: At rest)   Pulse 85   Temp 97 °F (36.1 °C)   Resp 14   Ht 5' 3\" (1.6 m)   Wt 68 kg (150 lb)   SpO2 97%   BMI 26.57 kg/m²         Physical Exam  Vitals signs reviewed. Constitutional:       General: She is not in acute distress. Appearance: Normal appearance. She is well-developed. She is not ill-appearing, toxic-appearing or diaphoretic. Comments: Patient resting in wheelchair in no distress ill. Skin color looks normal.  Patient is not diaphoretic. HENT:      Head: Normocephalic and atraumatic.    Eyes:      Conjunctiva/sclera: Conjunctivae normal.      Pupils: Pupils are equal, round, and reactive to light. Neck:      Musculoskeletal: Normal range of motion. Trachea: Trachea normal.   Cardiovascular:      Rate and Rhythm: Normal rate and regular rhythm. Heart sounds: Normal heart sounds, S1 normal and S2 normal.   Pulmonary:      Effort: Pulmonary effort is normal. No respiratory distress. Breath sounds: Normal breath sounds and air entry. Abdominal:      General: Bowel sounds are normal. There is no distension or abdominal bruit. Palpations: Abdomen is soft. Abdomen is not rigid. There is no shifting dullness, fluid wave, mass or pulsatile mass. Tenderness: There is no tenderness. There is no guarding. Negative signs include Sheikh's sign and McBurney's sign. Comments: No abdominal tenderness, guarding, no masses. Musculoskeletal:      Right lower leg: No edema. Left lower leg: No edema. Neurological:      General: No focal deficit present. Mental Status: She is alert and oriented to person, place, and time. Cranial Nerves: Cranial nerves are intact. Sensory: Sensation is intact. Motor: Motor function is intact. Coordination: Coordination is intact. Comments: Neurologically intact. Able to answer all questions. Patient able to follow commands. Psychiatric:         Behavior: Behavior is cooperative. Diagnostic Study Results     Labs -  Recent Results (from the past 12 hour(s))   CBC WITH AUTOMATED DIFF    Collection Time: 01/14/20  2:04 PM   Result Value Ref Range    WBC 4.5 (L) 4.6 - 13.2 K/uL    RBC 4.50 4. 20 - 5.30 M/uL    HGB 14.0 12.0 - 16.0 g/dL    HCT 41.5 35.0 - 45.0 %    MCV 92.2 74.0 - 97.0 FL    MCH 31.1 24.0 - 34.0 PG    MCHC 33.7 31.0 - 37.0 g/dL    RDW 12.7 11.6 - 14.5 %    PLATELET 274 428 - 220 K/uL    MPV 10.3 9.2 - 11.8 FL    NEUTROPHILS 73 40 - 73 %    LYMPHOCYTES 19 (L) 21 - 52 %    MONOCYTES 7 3 - 10 %    EOSINOPHILS 1 0 - 5 %    BASOPHILS 0 0 - 2 %    ABS.  NEUTROPHILS 3.3 1.8 - 8.0 K/UL    ABS. LYMPHOCYTES 0.8 (L) 0.9 - 3.6 K/UL    ABS. MONOCYTES 0.3 0.05 - 1.2 K/UL    ABS. EOSINOPHILS 0.0 0.0 - 0.4 K/UL    ABS.  BASOPHILS 0.0 0.0 - 0.1 K/UL    DF AUTOMATED     METABOLIC PANEL, BASIC    Collection Time: 01/14/20  2:04 PM   Result Value Ref Range    Sodium 136 136 - 145 mmol/L    Potassium 3.9 3.5 - 5.5 mmol/L    Chloride 101 100 - 111 mmol/L    CO2 27 21 - 32 mmol/L    Anion gap 8 3.0 - 18 mmol/L    Glucose 107 (H) 74 - 99 mg/dL    BUN 19 (H) 7.0 - 18 MG/DL    Creatinine 0.89 0.6 - 1.3 MG/DL    BUN/Creatinine ratio 21 (H) 12 - 20      GFR est AA >60 >60 ml/min/1.73m2    GFR est non-AA 60 (L) >60 ml/min/1.73m2    Calcium 9.5 8.5 - 10.1 MG/DL   CARDIAC PANEL,(CK, CKMB & TROPONIN)    Collection Time: 01/14/20  2:04 PM   Result Value Ref Range     (H) 26 - 192 U/L    CK - MB 6.7 (H) <3.6 ng/ml    CK-MB Index 2.4 0.0 - 4.0 %    Troponin-I, QT <0.02 0.0 - 0.045 NG/ML   URINALYSIS W/ RFLX MICROSCOPIC    Collection Time: 01/14/20  2:04 PM   Result Value Ref Range    Color YELLOW      Appearance CLOUDY      Specific gravity 1.017 1.005 - 1.030      pH (UA) 6.5 5.0 - 8.0      Protein 30 (A) NEG mg/dL    Glucose NEGATIVE  NEG mg/dL    Ketone 15 (A) NEG mg/dL    Bilirubin NEGATIVE  NEG      Blood SMALL (A) NEG      Urobilinogen 1.0 0.2 - 1.0 EU/dL    Nitrites NEGATIVE  NEG      Leukocyte Esterase MODERATE (A) NEG     URINE MICROSCOPIC ONLY    Collection Time: 01/14/20  2:04 PM   Result Value Ref Range    WBC 10 to 20 0 - 4 /hpf    RBC 3 to 5 0 - 5 /hpf    Epithelial cells 2+ 0 - 5 /lpf    Bacteria 1+ (A) NEG /hpf    Mucus 2+ (A) NEG /lpf    Hyaline cast 50 to 75 0 - 2 /lpf   EKG, 12 LEAD, INITIAL    Collection Time: 01/14/20  3:12 PM   Result Value Ref Range    Ventricular Rate 80 BPM    Atrial Rate 87 BPM    P-R Interval 154 ms    QRS Duration 120 ms    Q-T Interval 408 ms    QTC Calculation (Bezet) 470 ms    Calculated P Axis 25 degrees    Calculated R Axis -73 degrees    Calculated T Axis -23 degrees    Diagnosis       Sinus rhythm with marked sinus arrhythmia  Right bundle branch block  Left anterior fascicular block  Bifascicular block  Anterior infarct , age undetermined  T wave abnormality, consider lateral ischemia  Abnormal ECG  Confirmed by Donny Celis MD, Benito Pacer ([id number]) on 1/14/2020 3:49:00 PM         Radiologic Studies -   XR CHEST PA LAT   Final Result   IMPRESSION:      No evidence of acute cardiopulmonary abnormality. CT HEAD WO CONT   Final Result   IMPRESSION:   1. No acute intracranial hemorrhage, mass effect or midline structural shift. 2. 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 Follow-up with MRI is recommended if acute ischemia is suspected given   significant limitations of CT. Medical Decision Making   I am the first provider for this patient. I reviewed the vital signs, available nursing notes, past medical history, past surgical history, family history and social history. Vital Signs-Reviewed the patient's vital signs. Pulse Oximetry Analysis -  97 on room air (Interpretation)    EKG: Interpreted by attending. No acute MI. Sinus rhythm, bundle branch blocks. Records Reviewed: Nursing Notes and Old Medical Records (Time of Review: 4:10 PM)    ED Course: Progress Notes, Reevaluation, and Consults:  7730. Spoke to Dr. Yasmine Son to admit patient at [phone] for further cardiac evaluation and MRI. He had agreed to admit patient, however pt refused admission, she stated she felt fine and wanted to be home. Explained to patient the risk of death and deterioration and verbalized understanding. Daughter was at bedside as well and heard the risk. The patient ultimately decided she wanted to go home. Pt signed AMA form. Pt made aware she could return at any time. Pt told to follow up with her PCP as soon as possible. Dr. Yasmine Son was at bedside ready to assess patient patient refused admission. Provider Notes (Medical Decision Making):   Patient was in no distress.   She was neurologically intact. Did not have any deficits. EOM intact, no nystagmus. NIH was 0. Patient not having any chest pain, nausea, vomiting, abdominal pain, arm pain. Patient did not have any complaints. Patient denies being short of breath. Patient stated she felt like her normal self. Consulted with  in regards to admitting patient for observation and further work-up of her heart and MRI to rule out a stroke, she agreed with plan to admit and recommended to talk to hospitalist.  I spoke to hospitalist Wilbert Hernandez and he agreed to admit patient. Labs were reviewed , no acute findings. Troponin normal. Pt refused to stay for repeat troponin. Noticed WBC in urine will treat for UTI. For Hospitalized Patients:    1. Hospitalization Decision Time:  The decision to hospitalize the patient was made by Dr. Mica Pollard at 16:10 on 1/14/2020      Diagnosis     Clinical Impression:   1. Syncope, unspecified syncope type    2. Urinary tract infection without hematuria, site unspecified        Disposition: Left  AMA    Follow-up Information     Follow up With Specialties Details Why Contact Info    Tori Ruiz MD Family Practice Schedule an appointment as soon as possible for a visit in 1 day  [id number] [facility] EMERGENCY DEPT Emergency Medicine  If symptoms worsen [address] [place] [id number]  [phone]           Patient's Medications   Start Taking    CEPHALEXIN (KEFLEX) 500 MG CAPSULE    Take 1 Cap by mouth four (4) times daily for 7 days. Continue Taking    ASPIRIN DELAYED-RELEASE (ASPIRIN LOW DOSE) 81 MG TABLET    Take  by mouth daily. LOSARTAN-HYDROCHLOROTHIAZIDE (HYZAAR) 50-12.5 MG PER TABLET    Take 1 Tab by mouth daily. METOPROLOL-XL (TOPROL XL) 100 MG XL TABLET    Take  by mouth daily. MULTIVITAMIN (ONE A DAY) TABLET    Take 1 Tab by mouth daily. SIMVASTATIN (ZOCOR) 40 MG TABLET    Take 40 mg by mouth nightly.    These Medications have changed    No medications on file   Stop Taking    No medications on file       Dictation disclaimer:  Please note that this dictation was completed with [employer], the computer voice recognition software. Quite often unanticipated grammatical, syntax, homophones, and other interpretive errors are inadvertently transcribed by the computer software. Please disregard these errors. Please excuse any errors that have escaped final proofreading.

## 2020-01-14 NOTE — DISCHARGE INSTRUCTIONS
Patient Education        Lightheadedness or Faintness: Care Instructions  Your Care Instructions  Lightheadedness is a feeling that you are about to faint or \"pass out. \" You do not feel as if you or your surroundings are moving. It is different from vertigo, which is the feeling that you or things around you are spinning or tilting. Lightheadedness usually goes away or gets better when you lie down. If lightheadedness gets worse, it can lead to a fainting spell. It is common to feel lightheaded from time to time. Lightheadedness usually is not caused by a serious problem. It often is caused by a short-lasting drop in blood pressure and blood flow to your head that occurs when you get up too quickly from a seated or lying position. Follow-up care is a key part of your treatment and safety. Be sure to make and go to all appointments, and call your doctor if you are having problems. It's also a good idea to know your test results and keep a list of the medicines you take. How can you care for yourself at home? · Lie down for 1 or 2 minutes when you feel lightheaded. After lying down, sit up slowly and remain sitting for 1 to 2 minutes before slowly standing up. · Avoid movements, positions, or activities that have made you lightheaded in the past.  · Get plenty of rest, especially if you have a cold or flu, which can cause lightheadedness. · Make sure you drink plenty of fluids, especially if you have a fever or have been sweating. · Do not drive or put yourself and others in danger while you feel lightheaded. When should you call for help? Call 911 anytime you think you may need emergency care. For example, call if:    · You have symptoms of a stroke. These may include:  ? Sudden numbness, tingling, weakness, or loss of movement in your face, arm, or leg, especially on only one side of your body. ? Sudden vision changes. ? Sudden trouble speaking.   ? Sudden confusion or trouble understanding simple statements. ? Sudden problems with walking or balance. ? A sudden, severe headache that is different from past headaches.     · You have symptoms of a heart attack. These may include:  ? Chest pain or pressure, or a strange feeling in the chest.  ? Sweating. ? Shortness of breath. ? Nausea or vomiting. ? Pain, pressure, or a strange feeling in the back, neck, jaw, or upper belly or in one or both shoulders or arms. ? Lightheadedness or sudden weakness. ? A fast or irregular heartbeat. After you call 911, the  may tell you to chew 1 adult-strength or 2 to 4 low-dose aspirin. Wait for an ambulance. Do not try to drive yourself.    Watch closely for changes in your health, and be sure to contact your doctor if:    · Your lightheadedness gets worse or does not get better with home care. Where can you learn more? Go to http://jonnathanAsanamaris.info/. Enter I974 in the search box to learn more about \"Lightheadedness or Faintness: Care Instructions. \"  Current as of: June 26, 2019  Content Version: 12.2  © 9942-9368 [employer]. Care instructions adapted under license by [employer] (which disclaims liability or warranty for this information). If you have questions about a medical condition or this instruction, always ask your healthcare professional. [place] [address] any warranty or liability for your use of this information. Patient Education        Urinary Tract Infection in Women: Care Instructions  Your Care Instructions    A urinary tract infection, or UTI, is a general term for an infection anywhere between the kidneys and the urethra (where urine comes out). Most UTIs are bladder infections. They often cause pain or burning when you urinate. UTIs are caused by bacteria and can be cured with antibiotics. Be sure to complete your treatment so that the infection goes away. Follow-up care is a key part of your treatment and safety.  Be sure to make and go to all appointments, and call your doctor if you are having problems. It's also a good idea to know your test results and keep a list of the medicines you take. How can you care for yourself at home? · Take your antibiotics as directed. Do not stop taking them just because you feel better. You need to take the full course of antibiotics. · Drink extra water and other fluids for the next day or two. This may help wash out the bacteria that are causing the infection. (If you have kidney, heart, or liver disease and have to limit fluids, talk with your doctor before you increase your fluid intake.)  · Avoid drinks that are carbonated or have caffeine. They can irritate the bladder. · Urinate often. Try to empty your bladder each time. · To relieve pain, take a hot bath or lay a heating pad set on low over your lower belly or genital area. Never go to sleep with a heating pad in place. To prevent UTIs  · Drink plenty of water each day. This helps you urinate often, which clears bacteria from your system. (If you have kidney, heart, or liver disease and have to limit fluids, talk with your doctor before you increase your fluid intake.)  · Urinate when you need to. · Urinate right after you have sex. · Change sanitary pads often. · Avoid douches, bubble baths, feminine hygiene sprays, and other feminine hygiene products that have deodorants. · After going to the bathroom, wipe from front to back. When should you call for help? Call your doctor now or seek immediate medical care if:    · Symptoms such as fever, chills, nausea, or vomiting get worse or appear for the first time.     · You have new pain in your back just below your rib cage.  This is called flank pain.     · There is new blood or pus in your urine.     · You have any problems with your antibiotic medicine.    Watch closely for changes in your health, and be sure to contact your doctor if:    · You are not getting better after taking an antibiotic for 2 days.     · Your symptoms go away but then come back. Where can you learn more? Go to http://jonnathan-maris.info/. Enter P928 in the search box to learn more about \"Urinary Tract Infection in Women: Care Instructions. \"  Current as of: December 19, 2018  Content Version: 12.2  © 2738-5863 [employer]. Care instructions adapted under license by [employer] (which disclaims liability or warranty for this information). If you have questions about a medical condition or this instruction, always ask your healthcare professional. [place] [address] any warranty or liability for your use of this information. Patient Education        Vasovagal Syncope: Care Instructions  Your Care Instructions    Vasovagal syncope (say \"[id number] [id number]\")is sudden dizziness or fainting that can be set off by things such as pain, stress, fear, or trauma. You may sweat or feel lightheaded, sick to your stomach, or tingly. The problem causes the heart rate to slow and the blood vessels to widen, or dilate, for a short time. When this happens, blood pools in the lower body, and less blood goes to the brain. You can usually get relief by lying down with your legs raised (elevated). This helps more blood to flow to your brain and may help relieve symptoms like feeling dizzy. Some doctors may recommend a technique that involves tensing your fists and arms. This type of fainting is often easy to predict. For example, it happens to some people when they see blood or have to get a shot. They may feel symptoms before they faint. An episode of vasovagal syncope usually responds well to self-care. Other treatment often isn't needed. But if the fainting keeps happening, your doctor may suggest further treatments. Follow-up care is a key part of your treatment and safety.  Be sure to make and go to all appointments, and call your doctor if you are having problems. It's also a good idea to know your test results and keep a list of the medicines you take. How can you care for yourself at home? · Drink plenty of fluids to prevent dehydration. If you have kidney, heart, or liver disease and have to limit fluids, talk with your doctor before you increase your fluid intake. · Try to avoid things that you think may set off vasovagal syncope. · Talk to your doctor about any medicines you take. Some medicines may increase the chance of this condition occurring. · If you feel symptoms, lie down with your legs raised. Talk to your doctor about what to do if your symptoms come back. When should you call for help? Call 911 anytime you think you may need emergency care. For example, call if:    · You have symptoms of a heart problem. These may include:  ? Chest pain or pressure. ? Severe trouble breathing. ? A fast or irregular heartbeat.    Watch closely for changes in your health, and be sure to contact your doctor if:    · You have more episodes of fainting at home.     · You do not get better as expected. Where can you learn more? Go to http://jonnathan-maris.info/. Enter L754 in the search box to learn more about \"Vasovagal Syncope: Care Instructions. \"  Current as of: June 26, 2019  Content Version: 12.2  © 7768-7908 [employer]. Care instructions adapted under license by [employer] (which disclaims liability or warranty for this information). If you have questions about a medical condition or this instruction, always ask your healthcare professional. [place] [address] any warranty or liability for your use of this information. Follow up with PCP as soon as possible. Take keflex as prescribed. Take plenty fluids. Return if symptoms worsen.

## 2020-01-16 LAB
BACTERIA SPEC CULT: NORMAL
SERVICE CMNT-IMP: NORMAL

## 2020-01-28 ENCOUNTER — OFFICE VISIT (OUTPATIENT)
Dept: CARDIOLOGY CLINIC | Age: 85
End: 2020-01-28

## 2020-01-28 VITALS
OXYGEN SATURATION: 96 % | WEIGHT: 144 LBS | DIASTOLIC BLOOD PRESSURE: 80 MMHG | SYSTOLIC BLOOD PRESSURE: 134 MMHG | HEART RATE: 69 BPM | BODY MASS INDEX: 25.52 KG/M2 | HEIGHT: 63 IN

## 2020-01-28 DIAGNOSIS — I45.10 RBBB: Primary | ICD-10-CM

## 2020-01-28 RX ORDER — VALSARTAN AND HYDROCHLOROTHIAZIDE 80; 12.5 MG/1; MG/1
1 TABLET, FILM COATED ORAL DAILY
COMMUNITY

## 2020-01-28 NOTE — PROGRESS NOTES
Lenard Reyes presents today for   Chief Complaint   Patient presents with    Chest Pain     1 year follow up     Leg Swelling     mild       Lenard Reyes preferred language for health care discussion is english/other. Is someone accompanying this pt? Daughter     Is the patient using any DME equipment during [address]? no    Depression Screening:  3 most recent PHQ Screens 1/28/2020   Little interest or pleasure in doing things Not at all   Feeling down, depressed, irritable, or hopeless Not at all   Total Score PHQ 2 0       Learning Assessment:  Learning Assessment 1/28/2020   PRIMARY LEARNER Patient   CO-LEARNER CAREGIVER -   PRIMARY LANGUAGE ENGLISH   LEARNER PREFERENCE PRIMARY DEMONSTRATION   ANSWERED BY Patient   RELATIONSHIP SELF       Abuse Screening:  Abuse Screening Questionnaire 1/28/2020   Do you ever feel afraid of your partner? N   Are you in a relationship with someone who physically or mentally threatens you? N   Is it safe for you to go home? Y       Fall Risk  Fall Risk Assessment, last 12 mths 1/28/2020   Able to walk? Yes   Fall in past 12 months? No       Pt currently taking Anticoagulant therapy? ASA 81mg every day     Coordination of Care:  1. Have you been to the ER, urgent care clinic since your last visit? Hospitalized since your last visit? no    2. Have you seen or consulted any other health care providers outside of the [address] [place] [address] since your last visit? Include any pap smears or colon screening.  no

## 2020-01-28 NOTE — PROGRESS NOTES
HISTORY OF PRESENT ILLNESS  Hyun Barraza is a 80 y.o. female. ASSESSMENT and PLAN    Ms. Hyun Barraza has history of hypertension and palpitations. she has no complaints of or history of mahendra syncope. She has irregular heartbeats. Her Holter monitor obtained in October of 2014 showed mainly sinus rhythm, with arrhythmia. There were no significant ventricular tachycardic events. There were PVC's and limited SVT's. In February 2017, she presented to the emergency room with dizziness and syncope. Deyanira Dsouza was noted to be dehydrated.  Since that time, she has not had any further episodes.  For her orthostatic dizziness, I did advise her to be careful and take her time. In January 2020, she presented to the emergency room with a syncopal episode. Apparently, she had been struggling with fevers, chills, nausea and malaise for about a week and was subsequently diagnosed with UTI. According to the daughter, patient was somewhat dehydrated and she subsequently passed out while coming home from PCP visit. She was seen in the emergency room and was subsequently discharged home. · CAD:    She has no documented history of CAD. · BP:   Well controlled. · HR:    Stable. · CHF:    There is no evidence of decompensated CHF. · Weight:    Her weight today is 144 pounds. Her baseline weight is about 150 pounds. I have asked her to try to keep her weight 2140-150 pounds. · Cholesterol:   Target LDL <90. Zocor 40 daily. · Anti-platelet:   Remains on ASA.       She was accompanied by her daughter. All questions were answered. Her major issue appears to be difficulty hearing. I did discuss with the patient as well as with the daughter about further evaluation for syncope. By her history, it appears to be fairly consistent with her UTI, dehydration. Therefore, the plan is to continue to observe her and only pursue further evaluation if she has recurrent presyncope or syncope. She does not drive.   If she does have future episodes of dizziness or mahendra syncope, I would repeat a Holter monitor as well as an echocardiogram.  I will see her back annually. Thank you. Encounter Diagnoses   Name Primary?  RBBB Yes     current treatment plan is effective, no change in therapy  lab results and schedule of future lab studies reviewed with patient  reviewed diet, exercise and weight control  cardiovascular risk and specific lipid/LDL goals reviewed  use of aspirin to prevent MI and TIA's discussed        HPI   Today, Ms. Ermias Groves has no complaints of chest pains, increased shortness of breath or decreased exercise capacity. She denies any orthopnea or PND. She denies any palpitations or dizziness. She was accompanied by her daughter. Apparently, she had a bout of UTI, dehydration, abdominal syndrome with diarrhea back earlier in January. At that time, she presented with    Review of Systems   Respiratory: Negative for shortness of breath. Cardiovascular: Negative for chest pain, palpitations, orthopnea, claudication, leg swelling and PND. All other systems reviewed and are negative. Physical Exam   Constitutional: She is oriented to person, place, and time. She appears well-developed and well-nourished. HENT:   Head: Normocephalic. Eyes: Conjunctivae are normal.   Neck: Neck supple. No JVD present. Carotid bruit is not present. No thyromegaly present. Cardiovascular: Normal rate and regular rhythm. Pulmonary/Chest: Breath sounds normal.   Abdominal: Bowel sounds are normal.   Musculoskeletal:         General: No edema. Neurological: She is alert and oriented to person, place, and time. Skin: Skin is warm and dry. Nursing note and vitals reviewed. PCP: Leela Isbell MD    Past Medical History:   Diagnosis Date    Atrial arrhythmia     palpitation    Cancer ([facility] [address]     cervical    Echocardiogram 10/01/2014    EF 60%. No WMA. Mild LVH. Indeterminate diastolic fx. RVSP 40 mmHg. Mild LAE. Mild MR.  Mild-mod TR.      Holter monitor study 10/01/2014    24-hr Holter. Sinus rhythm w/change in conduction, avg HR 65 bpm (range 50-96 bpm). Single SVEs - 2590. Four runs of SVT, max 4 beats.  HTN (hypertension)     Joint pain     Palpitations     Pulmonary embolism ([address] 2002    Wears eyeglasses        History reviewed. No pertinent surgical history. Current Outpatient Medications   Medication Sig Dispense Refill    valsartan-hydroCHLOROthiazide (DIOVAN-HCT) 80-12.5 mg per tablet Take 1 Tab by mouth daily.  losartan-hydroCHLOROthiazide (HYZAAR) 50-12.5 mg per tablet Take 1 Tab by mouth daily.  multivitamin (ONE A DAY) tablet Take 1 Tab by mouth daily.  simvastatin (ZOCOR) 40 mg tablet Take 40 mg by mouth nightly.  metoprolol-XL (TOPROL XL) 100 mg XL tablet Take  by mouth daily.  aspirin delayed-release (ASPIRIN LOW DOSE) 81 mg tablet Take  by mouth daily. The patient has a family history of    Social History     Tobacco Use    Smoking status: Never Smoker    Smokeless tobacco: Never Used   Substance Use Topics    Alcohol use: No    Drug use: No       No results found for: CHOL, CHOLX, CHLST, CHOLV, HDL, HDLP, LDL, LDLC, DLDLP, TGLX, TRIGL, TRIGP, CHHD, CHHDX     BP Readings from Last 3 Encounters:   01/28/20 134/80   01/14/20 130/75   02/19/19 144/78        Pulse Readings from Last 3 Encounters:   01/28/20 69   01/14/20 85   02/19/19 81       Wt Readings from Last 3 Encounters:   01/28/20 65.3 kg (144 lb)   01/14/20 68 kg (150 lb)   02/19/19 68 kg (150 lb)         EKG: unchanged from previous tracings, normal sinus rhythm, RBBB.

## 2021-01-05 ENCOUNTER — OFFICE VISIT (OUTPATIENT)
Dept: CARDIOLOGY CLINIC | Age: 86
End: 2021-01-05
Payer: MEDICARE

## 2021-01-05 VITALS
HEIGHT: 63 IN | SYSTOLIC BLOOD PRESSURE: 160 MMHG | DIASTOLIC BLOOD PRESSURE: 72 MMHG | BODY MASS INDEX: 25.52 KG/M2 | WEIGHT: 144 LBS | HEART RATE: 81 BPM | OXYGEN SATURATION: 97 %

## 2021-01-05 DIAGNOSIS — R55 SYNCOPE, UNSPECIFIED SYNCOPE TYPE: ICD-10-CM

## 2021-01-05 DIAGNOSIS — I45.10 RBBB: Primary | ICD-10-CM

## 2021-01-05 PROCEDURE — G8427 DOCREV CUR MEDS BY ELIG CLIN: HCPCS | Performed by: INTERNAL MEDICINE

## 2021-01-05 PROCEDURE — G8419 CALC BMI OUT NRM PARAM NOF/U: HCPCS | Performed by: INTERNAL MEDICINE

## 2021-01-05 PROCEDURE — G8432 DEP SCR NOT DOC, RNG: HCPCS | Performed by: INTERNAL MEDICINE

## 2021-01-05 PROCEDURE — 93000 ELECTROCARDIOGRAM COMPLETE: CPT | Performed by: INTERNAL MEDICINE

## 2021-01-05 PROCEDURE — 99214 OFFICE O/P EST MOD 30 MIN: CPT | Performed by: INTERNAL MEDICINE

## 2021-01-05 PROCEDURE — 1090F PRES/ABSN URINE INCON ASSESS: CPT | Performed by: INTERNAL MEDICINE

## 2021-01-05 PROCEDURE — G8536 NO DOC ELDER MAL SCRN: HCPCS | Performed by: INTERNAL MEDICINE

## 2021-01-05 PROCEDURE — 1101F PT FALLS ASSESS-DOCD LE1/YR: CPT | Performed by: INTERNAL MEDICINE

## 2021-01-05 NOTE — PROGRESS NOTES
HISTORY OF PRESENT ILLNESS  Yuliet Recinos is a 80 y.o. female. ASSESSMENT and PLAN    Ms. Yuliet Recinos has history of hypertension and palpitations. she has no complaints of or history of mahendra syncope. She has irregular heartbeats. Her Holter monitor obtained in October of 2014 showed mainly sinus rhythm, with arrhythmia. There were no significant ventricular tachycardic events. There were PVC's and limited SVT's. In February 2017, she presented to the emergency room with dizziness and syncope. Joe Forman was noted to be dehydrated.  Since that time, she has not had any further episodes.  For her orthostatic dizziness, I did advise her to be careful and take her time. In January 2020, she presented to the emergency room with a syncopal episode. Apparently, she had been struggling with fevers, chills, nausea and malaise for about a week and was subsequently diagnosed with UTI. According to the daughter, patient was somewhat dehydrated and she subsequently passed out while coming home from PCP visit. She was seen in the emergency room and was subsequently discharged home.  CAD: She has no history of documented CAD.  BP:    Her blood pressure is mildly elevated today. However, with her prior history of dizzy spells, and syncope, would not tightly controlled.  Rhythm:    Remains in sinus rhythm. Heart rate is stable.  CHF:    There is no evidence of decompensated CHF noted.  Weight:    Her weight today is 744 pounds. This is near her baseline. It is acceptable.  Cholesterol:  Target LDL<90. Zocor 40.  Anticoagulant:  Remains on ASA. With the recent episode of syncope, will obtain echocardiogram and 4-week event monitor. I did discuss with her and her daughter who accompanied her at length, over 30 minutes about her current cardiac status as well as treatment options. He did not wish any aggressive measures. I did discuss with him about implantable loop recorder.   If the patient has recurrent episodes and no elucidating other etiology, we can reconsider implantable loop recorder. Her previous syncopal episodes are not consistent with malignant cardiac dysrhythmia; obviously, if she has slow heart rate, this is a possibility. No significant dysrhythmia has been captured previously. She may benefit from neurology evaluation. All questions were answered to their satisfaction. I will see her back annually. Thank you. Encounter Diagnoses   Name Primary?  RBBB Yes    Syncope, unspecified syncope type      current treatment plan is effective, no change in therapy  lab results and schedule of future lab studies reviewed with patient  reviewed diet, exercise and weight control  use of aspirin to prevent MI and TIA's discussed      HPI   Today, Ms. Lupe Victor has no complaints of chest pains, increased shortness of breath or decreased exercise capacity. She does have occasional episodes of palpitations and dizziness. She was recently seen at [facility] when she lost consciousness while driving back from [facility]. She was visiting family. Apparently, she was driving down with her one of her daughters when she felt quite not right and then just became unconscious but was breathing. There was no loss of bladder or bowel control. There was no associated shortness of breath or chest pains. She did not appreciate any palpitations. Review of Systems   Respiratory: Negative for shortness of breath. Cardiovascular: Negative for chest pain, palpitations, orthopnea, claudication, leg swelling and PND. Neurological: Negative for dizziness. All other systems reviewed and are negative. Physical Exam  Vitals signs and nursing note reviewed. Constitutional:       Appearance: Normal appearance. HENT:      Head: Normocephalic. Eyes:      Conjunctiva/sclera: Conjunctivae normal.   Neck:      Musculoskeletal: No neck rigidity. Vascular: No carotid bruit. Cardiovascular:      Rate and Rhythm: Normal rate and regular rhythm. Pulmonary:      Breath sounds: Normal breath sounds. Abdominal:      Palpations: Abdomen is soft. Musculoskeletal:         General: No swelling. Skin:     General: Skin is warm and dry. Neurological:      General: No focal deficit present. Mental Status: She is alert and oriented to person, place, and time. Psychiatric:         Mood and Affect: Mood normal.         Behavior: Behavior normal.         PCP: Tl Fox MD    Past Medical History:   Diagnosis Date    Atrial arrhythmia     palpitation    Cancer ([facility] [address]     cervical    Echocardiogram 10/01/2014    EF 60%. No WMA. Mild LVH. Indeterminate diastolic fx. RVSP 40 mmHg. Mild LAE. Mild MR.  Mild-mod TR.      Holter monitor study 10/01/2014    24-hr Holter. Sinus rhythm w/change in conduction, avg HR 65 bpm (range 50-96 bpm). Single SVEs - 2590. Four runs of SVT, max 4 beats.  HTN (hypertension)     Joint pain     Palpitations     Pulmonary embolism ([facility] [address] 2002    Wears eyeglasses        No past surgical history on file. Current Outpatient Medications   Medication Sig Dispense Refill    valsartan-hydroCHLOROthiazide (DIOVAN-HCT) 80-12.5 mg per tablet Take 1 Tab by mouth daily.  multivitamin (ONE A DAY) tablet Take 1 Tab by mouth daily.  simvastatin (ZOCOR) 40 mg tablet Take 40 mg by mouth nightly.  metoprolol-XL (TOPROL XL) 100 mg XL tablet Take  by mouth daily.  aspirin delayed-release (ASPIRIN LOW DOSE) 81 mg tablet Take  by mouth daily.          The patient has a family history of    Social History     Tobacco Use    Smoking status: Never Smoker    Smokeless tobacco: Never Used   Substance Use Topics    Alcohol use: No    Drug use: No       No results found for: CHOL, CHOLX, CHLST, CHOLV, HDL, HDLP, LDL, LDLC, DLDLP, TGLX, TRIGL, TRIGP, CHHD, CHHDX     BP Readings from Last 3 Encounters:   01/05/21 (!) 160/72   01/28/20 134/80   01/14/20 130/75        Pulse Readings from Last 3 Encounters:   01/05/21 81   01/28/20 69   01/14/20 85       Wt Readings from Last 3 Encounters:   01/05/21 65.3 kg (144 lb)   01/28/20 65.3 kg (144 lb)   01/14/20 68 kg (150 lb)         EKG: unchanged from previous tracings, normal sinus rhythm, RBBB.

## 2021-01-05 NOTE — PROGRESS NOTES
Yuliet Recinos presents today for No chief complaint on file. Yuliet Recinos preferred language for health care discussion is english/other. Is someone accompanying this pt? no    Is the patient using any DME equipment during [address]? no    Depression Screening:  3 most recent PHQ Screens 1/28/2020   Little interest or pleasure in doing things Not at all   Feeling down, depressed, irritable, or hopeless Not at all   Total Score PHQ 2 0       Learning Assessment:  Learning Assessment 1/28/2020   PRIMARY LEARNER Patient   CO-LEARNER CAREGIVER -   PRIMARY LANGUAGE ENGLISH   LEARNER PREFERENCE PRIMARY DEMONSTRATION   ANSWERED BY Patient   RELATIONSHIP SELF       Abuse Screening:  Abuse Screening Questionnaire 1/28/2020   Do you ever feel afraid of your partner? N   Are you in a relationship with someone who physically or mentally threatens you? N   Is it safe for you to go home? Y       Fall Risk  Fall Risk Assessment, last 12 mths 1/28/2020   Able to walk? Yes   Fall in past 12 months? No       Pt currently taking Anticoagulant therapy? ASA 81mg qd    Coordination of Care:  1. Have you been to the ER, urgent care clinic since your last visit? Hospitalized since your last visit? no    2. Have you seen or consulted any other health care providers outside of the [address] [place] [address] since your last visit? Include any pap smears or colon screening.  no

## 2021-01-14 LAB
AV R PG: 37.43 MMHG
ECHO AO ROOT DIAM: 2.9 CM
ECHO AR MAX VEL PISA: 305.68 CM/S
ECHO AV REGURGITANT PHT: 511.27 MS
ECHO LA AREA 4C: 21.4 CM2
ECHO LA VOL 2C: 94.87 ML (ref 22–52)
ECHO LA VOL 4C: 68.55 ML (ref 22–52)
ECHO LA VOL BP: 98.17 ML (ref 22–52)
ECHO LA VOL/BSA BIPLANE: 58.37 ML/M2 (ref 16–28)
ECHO LA VOLUME INDEX A2C: 56.41 ML/M2 (ref 16–28)
ECHO LA VOLUME INDEX A4C: 40.76 ML/M2 (ref 16–28)
ECHO LV INTERNAL DIMENSION DIASTOLIC: 4.46 CM (ref 3.9–5.3)
ECHO LV INTERNAL DIMENSION SYSTOLIC: 2.76 CM
ECHO LV IVSD: 0.91 CM (ref 0.6–0.9)
ECHO LV MASS 2D: 131.9 G (ref 67–162)
ECHO LV MASS INDEX 2D: 78.4 G/M2 (ref 43–95)
ECHO LV POSTERIOR WALL DIASTOLIC: 0.9 CM (ref 0.6–0.9)
ECHO LVOT DIAM: 1.95 CM
ECHO LVOT PEAK GRADIENT: 2.2 MMHG
ECHO LVOT PEAK VELOCITY: 74.12 CM/S
ECHO LVOT SV: 52.4 ML
ECHO LVOT VTI: 17.55 CM
ECHO MV AREA PISA: 0.06 CM2
ECHO MV REGURGITANT RADIUS PISA: 0.49 CM
ECHO MV REGURGITANT VOLUME: 12.49 ML
ECHO MV REGURGITANT VTIA: 197.7 CM
ECHO PV REGURGITANT MAX VELOCITY: 357.22 CM/S
ECHO PV REGURGITANT MAX VELOCITY: 588.96 CM/S
ECHO PVEIN A DURATION: 106.57 MS
ECHO PVEIN A VELOCITY: 33.19 CM/S
ECHO RV TAPSE: 2.35 CM (ref 1.5–2)
ECHO TV REGURGITANT PEAK GRADIENT: 51.31 MMHG
LVOT MG: 1.39 MMHG

## 2021-01-14 NOTE — PROGRESS NOTES
Per your last note\" Ms. Regina Celis has history of hypertension and palpitations. she has no complaints of or history of mahendra syncope. She has irregular heartbeats. Her Holter monitor obtained in October of 2014 showed mainly sinus rhythm, with arrhythmia. There were no significant ventricular tachycardic events. There were PVC's and limited SVT's. In February 2017, she presented to the emergency room with dizziness and syncope. Tracey eRbollar was noted to be dehydrated.  Since that time, she has not had any further episodes.  For her orthostatic dizziness, I did advise her to be careful and take her time. In January 2020, she presented to the emergency room with a syncopal episode.  Apparently, she had been struggling with fevers, chills, nausea and malaise for about a week and was subsequently diagnosed with UTI.  According to the daughter, patient was somewhat dehydrated and she subsequently passed out while coming home from PCP visit. Tracey Rebollar was seen in the emergency room and was subsequently discharged home.     · CAD: She has no history of documented CAD. · BP:    Her blood pressure is mildly elevated today. However, with her prior history of dizzy spells, and syncope, would not tightly controlled. · Rhythm:    Remains in sinus rhythm. Heart rate is stable. · CHF:    There is no evidence of decompensated CHF noted. · Weight:    Her weight today is 744 pounds. This is near her baseline. It is acceptable. · Cholesterol:  Target LDL<90. Zocor 40. · Anticoagulant:  Remains on ASA.       With the recent episode of syncope, will obtain echocardiogram and 4-week event monitor. I did discuss with her and her daughter who accompanied her at length, over 30 minutes about her current cardiac status as well as treatment options. He did not wish any aggressive measures. I did discuss with him about implantable loop recorder.   If the patient has recurrent episodes and no elucidating other etiology, we can reconsider implantable loop recorder. Her previous syncopal episodes are not consistent with malignant cardiac dysrhythmia; obviously, if she has slow heart rate, this is a possibility. No significant dysrhythmia has been captured previously. She may benefit from neurology evaluation. All questions were answered to their satisfaction.

## 2021-01-27 ENCOUNTER — TELEPHONE (OUTPATIENT)
Dept: CARDIOLOGY CLINIC | Age: 86
End: 2021-01-27

## 2021-01-27 NOTE — TELEPHONE ENCOUNTER
----- Message from 1598 Aureliano Perez MD sent at 1/26/2021 12:15 PM EST ----- Let the patient know that her echocardiogram is without significant change from previous. 
----- Message ----- From: Isauro Lake LPN Sent: 1/14/2021   1:47 PM EST To: [id number] Aureliano Perez MD 
 
Per your last note\" Ms. Janna Heaton has history of hypertension and palpitations. she has no complaints of or history of mahendra syncope. She has irregular heartbeats. Her Holter monitor obtained in October of 2014 showed mainly sinus rhythm, with arrhythmia. There were no significant ventricular tachycardic events. There were PVC's and limited SVT's. In February 2017, she presented to the emergency room with dizziness and syncope. Jose Byers was noted to be dehydrated.  Since that time, she has not had any further episodes.  For her orthostatic dizziness, I did advise her to be careful and take her time. In January 2020, she presented to the emergency room with a syncopal episode.  Apparently, she had been struggling with fevers, chills, nausea and malaise for about a week and was subsequently diagnosed with UTI.  According to the daughter, patient was somewhat dehydrated and she subsequently passed out while coming home from PCP visit. Jose Byers was seen in the emergency room and was subsequently discharged home. 
  
· CAD: She has no history of documented CAD. · BP:    Her blood pressure is mildly elevated today. However, with her prior history of dizzy spells, and syncope, would not tightly controlled. · Rhythm:    Remains in sinus rhythm. Heart rate is stable. · CHF:    There is no evidence of decompensated CHF noted. · Weight:    Her weight today is 744 pounds. This is near her baseline. It is acceptable. · Cholesterol:  Target LDL<90. Zocor 40. · Anticoagulant:  Remains on ASA.    
  
 With the recent episode of syncope, will obtain echocardiogram and 4-week event monitor. I did discuss with her and her daughter who accompanied her at length, over 30 minutes about her current cardiac status as well as treatment options. He did not wish any aggressive measures. I did discuss with him about implantable loop recorder. If the patient has recurrent episodes and no elucidating other etiology, we can reconsider implantable loop recorder. Her previous syncopal episodes are not consistent with malignant cardiac dysrhythmia; obviously, if she has slow heart rate, this is a possibility. No significant dysrhythmia has been captured previously. She may benefit from neurology evaluation. All questions were answered to their satisfaction.

## 2021-01-27 NOTE — LETTER
1/27/2021 Ms. Lorie Gonzalez [address] [place] [address]. [id number] Dear Ms. Tim Licona, We have been unable to reach you by phone to notify you of your test results. Please call our office at [phone] and ask to speak with my nurse in order to explain these results to you and advise you of any recommendations.  
 
 
Sincerely, 
 
 
 
Jackie Echols MD

## 2022-01-26 ENCOUNTER — OFFICE VISIT (OUTPATIENT)
Dept: CARDIOLOGY CLINIC | Age: 87
End: 2022-01-26
Payer: MEDICARE

## 2022-01-26 VITALS
OXYGEN SATURATION: 99 % | DIASTOLIC BLOOD PRESSURE: 84 MMHG | SYSTOLIC BLOOD PRESSURE: 132 MMHG | HEIGHT: 63 IN | HEART RATE: 78 BPM | WEIGHT: 140 LBS | BODY MASS INDEX: 24.8 KG/M2

## 2022-01-26 DIAGNOSIS — R55 SYNCOPE, UNSPECIFIED SYNCOPE TYPE: Primary | ICD-10-CM

## 2022-01-26 PROCEDURE — G8427 DOCREV CUR MEDS BY ELIG CLIN: HCPCS | Performed by: INTERNAL MEDICINE

## 2022-01-26 PROCEDURE — 93000 ELECTROCARDIOGRAM COMPLETE: CPT | Performed by: INTERNAL MEDICINE

## 2022-01-26 PROCEDURE — 99214 OFFICE O/P EST MOD 30 MIN: CPT | Performed by: INTERNAL MEDICINE

## 2022-01-26 PROCEDURE — G8420 CALC BMI NORM PARAMETERS: HCPCS | Performed by: INTERNAL MEDICINE

## 2022-01-26 PROCEDURE — G8510 SCR DEP NEG, NO PLAN REQD: HCPCS | Performed by: INTERNAL MEDICINE

## 2022-01-26 PROCEDURE — G8536 NO DOC ELDER MAL SCRN: HCPCS | Performed by: INTERNAL MEDICINE

## 2022-01-26 PROCEDURE — 1090F PRES/ABSN URINE INCON ASSESS: CPT | Performed by: INTERNAL MEDICINE

## 2022-01-26 PROCEDURE — 1101F PT FALLS ASSESS-DOCD LE1/YR: CPT | Performed by: INTERNAL MEDICINE

## 2022-01-26 NOTE — PROGRESS NOTES
Magalis Patel presents today for   Chief Complaint   Patient presents with    Follow-up     1 year    Shortness of Breath     with exertion       Magalis Patel preferred language for health care discussion is english/other. Is someone accompanying this pt? daughter    Is the patient using any DME equipment during [address]? no    Depression Screening:  3 most recent PHQ Screens 1/26/2022   Little interest or pleasure in doing things Not at all   Feeling down, depressed, irritable, or hopeless Not at all   Total Score PHQ 2 0       Learning Assessment:  Learning Assessment 1/26/2022   PRIMARY LEARNER Patient   CO-LEARNER CAREGIVER -   PRIMARY LANGUAGE ENGLISH   LEARNER PREFERENCE PRIMARY DEMONSTRATION   ANSWERED BY patient   RELATIONSHIP SELF       Abuse Screening:  Abuse Screening Questionnaire 1/26/2022   Do you ever feel afraid of your partner? N   Are you in a relationship with someone who physically or mentally threatens you? N   Is it safe for you to go home? Y       Fall Risk  Fall Risk Assessment, last 12 mths 1/26/2022   Able to walk? Yes   Fall in past 12 months? 0   Do you feel unsteady? 0   Are you worried about falling 0           Pt currently taking Anticoagulant therapy? no    Pt currently taking Antiplatelet therapy ? Aspirin 81 mg      Coordination of Care:  1. Have you been to the ER, urgent care clinic since your last visit? Hospitalized since your last visit? no    2. Have you seen or consulted any other health care providers outside of the [address] [place] [address] since your last visit? Include any pap smears or colon screening.  no

## 2022-01-26 NOTE — PROGRESS NOTES
HISTORY OF PRESENT ILLNESS  Belen Felix is a 80 y.o. female. ASSESSMENT and PLAN    Ms. Belen Felix has history of hypertension and palpitations. she has no complaints of or history of mahendra syncope. She has irregular heartbeats. Her Holter monitor obtained in October of 2014 showed mainly sinus rhythm, with arrhythmia. There were no significant ventricular tachycardic events. There were PVC's and limited SVT's. In February 2017, she presented to the emergency room with dizziness and syncope. Aysha Bañuelos was noted to be dehydrated.  Since that time, she has not had any further episodes.  For her orthostatic dizziness, I did advise her to be careful and take her time. In January 2020, she presented to the emergency room with a syncopal episode.  Apparently, she had been struggling with fevers, chills, nausea and malaise for about a week and was subsequently diagnosed with UTI.  According to the daughter, patient was somewhat dehydrated and she subsequently passed out while coming home from PCP visit. Aysha Bañuelos was seen in the emergency room and was subsequently discharged home. Her echocardiogram done in January 2021 showed normal LV function with EF 55-60%, moderate pulmonary hypertension with PA pressure 54 mmHg, mild MR.    · CAD:    She has no documented history of CAD. · BP:    Well controlled today. · Rhythm:    Sinus rhythm with dysrhythmia. · CHF:    There is no evidence of decompensated CHF noted. · Weight:     Her weight today is 140 pounds. Her baseline weight is around 144 pounds. · Cholesterol:   Target LDL <90. Zocor 40. · Anti-platelet:   Remains on ASA. She is doing well from cardiac standpoint. Her biggest issue right now appears to be poor hearing. Her daughter who is her primary caregiver is pursuing possible options. I will see her back annually. Thank you. Encounter Diagnoses   Name Primary?     Syncope, unspecified syncope type Yes     current treatment plan is effective, no change in therapy  lab results and schedule of future lab studies reviewed with patient  reviewed diet, exercise and weight control  cardiovascular risk and specific lipid/LDL goals reviewed  use of aspirin to prevent MI and TIA's discussed      HPI   Today, Ms. Angelo Duggan has no complaints of chest pains, increased shortness of breath or decreased exercise capacity. Her only complaint is hard of hearing. She is again accompanied by her daughter who is her primary caregiver. There is no complaints of orthopnea or PND. There is no complaints of palpitation or dizziness. Despite her age, she remains quite active physically. Review of Systems   HENT: Positive for hearing loss. Respiratory: Negative for shortness of breath. Cardiovascular: Negative for chest pain, palpitations, orthopnea, claudication, leg swelling and PND. All other systems reviewed and are negative. Physical Exam  Vitals and nursing note reviewed. Constitutional:       Appearance: Normal appearance. HENT:      Head: Normocephalic. Eyes:      Conjunctiva/sclera: Conjunctivae normal.   Neck:      Vascular: No carotid bruit. Cardiovascular:      Rate and Rhythm: Normal rate and regular rhythm. Pulmonary:      Breath sounds: Normal breath sounds. Abdominal:      Palpations: Abdomen is soft. Musculoskeletal:         General: No swelling. Cervical back: No rigidity. Skin:     General: Skin is warm and dry. Neurological:      General: No focal deficit present. Mental Status: She is alert and oriented to person, place, and time. Psychiatric:         Mood and Affect: Mood normal.         Behavior: Behavior normal.         PCP: Nury Pereira MD    Past Medical History:   Diagnosis Date    Atrial arrhythmia     palpitation    Cancer ([address]     cervical    Echocardiogram 10/01/2014    EF 60%. No WMA. Mild LVH. Indeterminate diastolic fx. RVSP 40 mmHg. Mild LAE.   Mild MR.  Mild-mod TR.      Holter monitor study 10/01/2014    24-hr Holter. Sinus rhythm w/change in conduction, avg HR 65 bpm (range 50-96 bpm). Single SVEs - 2590. Four runs of SVT, max 4 beats.  HTN (hypertension)     Joint pain     Palpitations     Pulmonary embolism ([address] 2002    Wears eyeglasses        History reviewed. No pertinent surgical history. Current Outpatient Medications   Medication Sig Dispense Refill    valsartan-hydroCHLOROthiazide (DIOVAN-HCT) 80-12.5 mg per tablet Take 1 Tab by mouth daily.  multivitamin (ONE A DAY) tablet Take 1 Tab by mouth daily.  simvastatin (ZOCOR) 40 mg tablet Take 40 mg by mouth nightly.  metoprolol-XL (TOPROL XL) 100 mg XL tablet Take  by mouth daily.  aspirin delayed-release (ASPIRIN LOW DOSE) 81 mg tablet Take  by mouth daily. The patient has a family history of    Social History     Tobacco Use    Smoking status: Never Smoker    Smokeless tobacco: Never Used   Substance Use Topics    Alcohol use: No    Drug use: No       No results found for: CHOL, CHOLX, CHLST, CHOLV, HDL, HDLP, LDL, LDLC, DLDLP, TGLX, TRIGL, TRIGP, CHHD, CHHDX     BP Readings from Last 3 Encounters:   01/26/22 132/84   01/14/21 (!) 160/72   01/05/21 (!) 160/72        Pulse Readings from Last 3 Encounters:   01/26/22 78   01/05/21 81   01/28/20 69       Wt Readings from Last 3 Encounters:   01/26/22 63.5 kg (140 lb)   01/14/21 65.3 kg (144 lb)   01/05/21 65.3 kg (144 lb)         EKG: sinus rhythm with dysrhythmia, RBBB, unchanged.

## 2022-03-19 PROBLEM — R55 SYNCOPE: Status: ACTIVE | Noted: 2020-01-14

## 2022-04-11 ENCOUNTER — OFFICE VISIT (OUTPATIENT)
Dept: ORTHOPEDIC SURGERY | Age: 87
End: 2022-04-11
Payer: MEDICARE

## 2022-04-11 VITALS — OXYGEN SATURATION: 100 % | WEIGHT: 140 LBS | BODY MASS INDEX: 24.8 KG/M2 | HEART RATE: 66 BPM | TEMPERATURE: 97.7 F

## 2022-04-11 DIAGNOSIS — S80.11XA CONTUSION OF RIGHT LOWER LEG, INITIAL ENCOUNTER: ICD-10-CM

## 2022-04-11 DIAGNOSIS — M17.12 PRIMARY OSTEOARTHRITIS OF LEFT KNEE: ICD-10-CM

## 2022-04-11 DIAGNOSIS — M25.561 RIGHT KNEE PAIN, UNSPECIFIED CHRONICITY: Primary | ICD-10-CM

## 2022-04-11 DIAGNOSIS — M17.11 PRIMARY OSTEOARTHRITIS OF RIGHT KNEE: ICD-10-CM

## 2022-04-11 DIAGNOSIS — M85.88 OSTEOPENIA OF OTHER SITE: ICD-10-CM

## 2022-04-11 PROCEDURE — 1090F PRES/ABSN URINE INCON ASSESS: CPT | Performed by: SPECIALIST

## 2022-04-11 PROCEDURE — 20610 DRAIN/INJ JOINT/BURSA W/O US: CPT | Performed by: SPECIALIST

## 2022-04-11 PROCEDURE — 1101F PT FALLS ASSESS-DOCD LE1/YR: CPT | Performed by: SPECIALIST

## 2022-04-11 PROCEDURE — G8427 DOCREV CUR MEDS BY ELIG CLIN: HCPCS | Performed by: SPECIALIST

## 2022-04-11 PROCEDURE — G8432 DEP SCR NOT DOC, RNG: HCPCS | Performed by: SPECIALIST

## 2022-04-11 PROCEDURE — G8420 CALC BMI NORM PARAMETERS: HCPCS | Performed by: SPECIALIST

## 2022-04-11 PROCEDURE — G8536 NO DOC ELDER MAL SCRN: HCPCS | Performed by: SPECIALIST

## 2022-04-11 PROCEDURE — 99203 OFFICE O/P NEW LOW 30 MIN: CPT | Performed by: SPECIALIST

## 2022-04-11 PROCEDURE — 73562 X-RAY EXAM OF KNEE 3: CPT | Performed by: SPECIALIST

## 2022-04-11 RX ORDER — BETAMETHASONE SODIUM PHOSPHATE AND BETAMETHASONE ACETATE 3; 3 MG/ML; MG/ML
6 INJECTION, SUSPENSION INTRA-ARTICULAR; INTRALESIONAL; INTRAMUSCULAR; SOFT TISSUE ONCE
Status: COMPLETED | OUTPATIENT
Start: 2022-04-11 | End: 2022-04-11

## 2022-04-11 RX ADMIN — BETAMETHASONE SODIUM PHOSPHATE AND BETAMETHASONE ACETATE 6 MG: 3; 3 INJECTION, SUSPENSION INTRA-ARTICULAR; INTRALESIONAL; INTRAMUSCULAR; SOFT TISSUE at 15:45

## 2022-04-11 NOTE — PROGRESS NOTES
Patient: Iva Knight                MRN: [id number]       SSN: [id number]  YOB: 1930        AGE: 80 y.o. SEX: female    PCP: Alexandria Cheung MD  04/11/22    Chief Complaint   Patient presents with    Knee Pain     RT     HISTORY:  Iva Knight is a 80 y.o. female who is seen for increased right knee pain. She has been experiencing right knee pain for the past few years. She recently bumped her right knee on a piece of lawn furniture. She feels pain with sitting, standing, walking and stair climbing. She experiences startup pain after sitting. She takes two Tylenol every morning. She used to see [facility] for osteoporosis. She responded to previous Euflexxa series. Pain Assessment  4/11/2022   Location of Pain Knee   Location Modifiers Right   Severity of Pain 8   Quality of Pain Sharp; Throbbing;Aching   Quality of Pain Comment PAIN IS IN THE BACK OF KNEE AND WORKS ITS WAY TO THE FRONT   Duration of Pain Persistent   Frequency of Pain Constant   Aggravating Factors Walking;Standing;Bending   Limiting Behavior Yes     Occupation, etc:  Ms. 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 She lives alone in [place]. She has a good support system. She lives across the street from her daughter. Ms. Alyssa Marquez weighs 140 lbs and is 5'3\" tall. She has significant hearing difficulty. She takes baby aspirin. She has 2 sons, 2 daughters, 5 grand and 9 great grandchildren.     No results found for: HBA1C, PMV9WBIT, XIM4SHLK, WUH9HPCV  Weight Metrics 4/11/2022 1/26/2022 1/14/2021 1/5/2021 1/28/2020 1/14/2020 2/19/2019   Weight 140 lb 140 lb 144 lb 144 lb 144 lb 150 lb 150 lb   BMI 24.8 kg/m2 24.8 kg/m2 25.51 kg/m2 25.51 kg/m2 25.51 kg/m2 26.57 kg/m2 26.57 kg/m2       Patient Active Problem List   Diagnosis Code    Palpitations R00.2    HTN (hypertension) I10    RBBB I45.10    Sinus arrhythmia I49.8    Syncope R55     REVIEW OF SYSTEMS:    Constitutional Symptoms: Negative   Eyes: Negative   Ears, Nose, Throat and Mouth: Negative   Cardiovascular: Negative   Respiratory: Negative   Genitourinary: Per HPI   Gastrointestinal: Per HPI   Integumentary (Skin and/or Breast): Negative   Musculoskeletal: Per HPI   Endocrine/Rheumatologic: Negative   Neurological: Per HPI   Hematology/Lymphatic: Negative    Allergic/Immunologic: Negative   Phychiatric: Negative    Social History     Socioeconomic History    Marital status:      Spouse name: Not on file    Number of children: Not on file    Years of education: Not on file    Highest education level: Not on file   Occupational History    Not on file   Tobacco Use    Smoking status: Never Smoker    Smokeless tobacco: Never Used   Substance and Sexual Activity    Alcohol use: No    Drug use: No    Sexual activity: Not on file   Other Topics Concern    Not on file   Social History Narrative    Not on file     Social Determinants of Health     Financial Resource Strain:     Difficulty of Paying Living Expenses: Not on file   Food Insecurity:     Worried About Running Out of Food in the Last Year: Not on file    Colby of Food in the Last Year: Not on file   Transportation Needs:     Lack of Transportation (Medical): Not on file    Lack of Transportation (Non-Medical):  Not on file   Physical Activity:     Days of Exercise per Week: Not on file    Minutes of Exercise per Session: Not on file   Stress:     Feeling of Stress : Not on file   Social Connections:     Frequency of Communication with Friends and Family: Not on file    Frequency of Social Gatherings with Friends and Family: Not on file    Attends Catholic Services: Not on file    Active Member of Clubs or Organizations: Not on file    Attends Club or Organization Meetings: Not on file    Marital Status: Not on file   Intimate Partner Violence:     Fear of Current or Ex-Partner: Not on file    Emotionally Abused: Not on file    Physically Abused: Not on file   Becky Sexually Abused: Not on file   Housing Stability:     Unable to Pay for Housing in the Last Year: Not on file    Number of Places Lived in the Last Year: Not on file    Unstable Housing in the Last Year: Not on file      Allergies   Allergen Reactions    Strawberry Hives      Current Outpatient Medications   Medication Sig    valsartan-hydroCHLOROthiazide (DIOVAN-HCT) 80-12.5 mg per tablet Take 1 Tab by mouth daily.  multivitamin (ONE A DAY) tablet Take 1 Tab by mouth daily.  simvastatin (ZOCOR) 40 mg tablet Take 40 mg by mouth nightly.  metoprolol-XL (TOPROL XL) 100 mg XL tablet Take  by mouth daily.  aspirin delayed-release (ASPIRIN LOW DOSE) 81 mg tablet Take  by mouth daily. No current facility-administered medications for this visit.       PHYSICAL EXAMINATION:  Visit Vitals  Pulse 66   Temp 97.7 °F (36.5 °C)   Wt 140 lb (63.5 kg)   SpO2 100%   BMI 24.80 kg/m²      ORTHO EXAMINATION:  Examination Right knee Left knee   Skin Intact, large ecchymotic area mid tibia to ankle Intact   Range of motion 100-5 120-0   Effusion - -   Medial joint line tenderness + +   Lateral joint line tenderness - -   Popliteal tenderness - -   Osteophytes palpable + +   Camryns - -   Patella crepitus - -   Anterior drawer - -   Lateral laxity - -   Medial laxity - -   Varus deformity - -   Valgus deformity - -   Pretibial edema - -   Calf tenderness - -     TIME OUT:  Chart reviewed for the following:   Lizeth FLANAGAN MD, have reviewed the History, Physical and updated the Allergic reactions for Kristal Aguirre performed immediately prior to start of procedure:  Dafne Wilder MD, have performed the following reviews on [facility] prior to the start of the procedure:          * Patient was identified by name and date of birth   * Agreement on procedure being performed was verified  * Risks and Benefits explained to the patient  * Procedure site verified and marked as necessary  * Patient was positioned for comfort  * Consent was obtained     Time: 3:28 PM     Date of procedure: 4/11/2022  Procedure performed by:  Quique Crabtree MD  Ms. Richter tolerated the procedure well with no complications. RADIOGRAPHS:  XR RIGHT KNEE 4/11/22 SHANNON  IMPRESSION:  Three views with bilateral knees on AP view - No fractures, no effusion, severe R>L joint space narrowing, + osteophytes present. Kellgren Hari grade 4, osteopenia. IMPRESSION:      ICD-10-CM ICD-9-CM    1. Right knee pain, unspecified chronicity  M25.561 719.46 AMB POC X-RAY KNEE 3 VIEW   2. Contusion of right lower leg, initial encounter  S80.11XA 924.10    3. Primary osteoarthritis of right knee  M17.11 715.16 PROCEDURE AUTHORIZATION TO       betamethasone (CELESTONE) injection 6 mg      TX DRAIN/INJECT LARGE JOINT/BURSA   4. Primary osteoarthritis of left knee  M17.12 715.16 PROCEDURE AUTHORIZATION TO       betamethasone (CELESTONE) injection 6 mg      TX DRAIN/INJECT LARGE JOINT/BURSA   5. Osteopenia of other site  M85.88 733.90 DEXA BONE DENSITY STUDY AXIAL     PLAN: Bone density study ordered. Consider visco supplementation if pain continues. After discussing treatment options, patient's knees were injected with 4 cc Marcaine and 1/2 cc Celestone. There is no need for surgery at this time. She will follow up as needed.       Scribed by MD Joseline Galindo) as dictated by Quique Crabtree MD

## 2022-05-10 ENCOUNTER — HOSPITAL ENCOUNTER (OUTPATIENT)
Dept: BONE DENSITY | Age: 87
Discharge: HOME | End: 2022-05-10
Attending: SPECIALIST
Payer: MEDICARE

## 2022-05-10 PROCEDURE — 77080 DXA BONE DENSITY AXIAL: CPT

## 2022-06-09 ENCOUNTER — OFFICE VISIT (OUTPATIENT)
Dept: ORTHOPEDIC SURGERY | Age: 87
End: 2022-06-09
Payer: MEDICARE

## 2022-06-09 VITALS
BODY MASS INDEX: 24.27 KG/M2 | OXYGEN SATURATION: 98 % | TEMPERATURE: 97.8 F | WEIGHT: 137 LBS | HEIGHT: 63 IN | HEART RATE: 82 BPM

## 2022-06-09 DIAGNOSIS — G89.29 CHRONIC PAIN OF RIGHT KNEE: ICD-10-CM

## 2022-06-09 DIAGNOSIS — M17.11 PRIMARY OSTEOARTHRITIS OF RIGHT KNEE: Primary | ICD-10-CM

## 2022-06-09 DIAGNOSIS — M25.561 CHRONIC PAIN OF RIGHT KNEE: ICD-10-CM

## 2022-06-09 PROCEDURE — 20610 DRAIN/INJ JOINT/BURSA W/O US: CPT | Performed by: SPECIALIST

## 2022-06-09 NOTE — PROGRESS NOTES
Patient: Lawanda Kunh                MRN: [id number]       SSN: [id number]  YOB: 1930        AGE: 80 y.o. SEX: female    PCP: Skip Cabrales MD  06/09/22    Chief Complaint   Patient presents with    Knee Pain     rt     HISTORY:  Lawanda Kuhn is a 80 y.o. female who is seen for increased right knee pain. She has been experiencing right knee pain for the past few years. She recently bumped her right knee on a piece of lawn furniture. She feels pain with sitting, standing, walking and stair climbing. She experiences startup pain after sitting. She takes two Tylenol every morning. She has been treated for osteoporosis. She responded to previous Euflexxa series. Pain Assessment  6/9/2022   Location of Pain Knee   Location Modifiers Right   Severity of Pain 8   Quality of Pain Dull;Aching   Quality of Pain Comment -   Duration of Pain Persistent   Frequency of Pain Constant   Aggravating Factors Walking;Standing;Squatting;Bending   Limiting Behavior Yes   Relieving Factors Nothing   Result of Injury No     Occupation, etc:  Ms. Rayna Mayberry is retired. She was a homemaker. She  at age 13. She lives alone in [place]. She has a good support system. She lives across the street from her daughter. Ms. Rayna Mayberry weighs 140 lbs and is 5'3\" tall. She has significant hearing difficulty. She takes baby aspirin. She has 2 sons, 2 daughters, 5 grand and 9 great grandchildren. [facility] her [facility] today.     No results found for: HBA1C, DGU4USVH, WJR7VOUH, JHQ8ELBB  Weight Metrics 6/9/2022 4/11/2022 1/26/2022 1/14/2021 1/5/2021 1/28/2020 1/14/2020   Weight 137 lb 140 lb 140 lb 144 lb 144 lb 144 lb 150 lb   BMI 24.27 kg/m2 24.8 kg/m2 24.8 kg/m2 25.51 kg/m2 25.51 kg/m2 25.51 kg/m2 26.57 kg/m2       Patient Active Problem List   Diagnosis Code    Palpitations R00.2    HTN (hypertension) I10    RBBB I45.10    Sinus arrhythmia I49.8    Syncope R55 REVIEW OF SYSTEMS:    Constitutional Symptoms: Negative   Eyes: Negative   Ears, Nose, Throat and Mouth: Negative   Cardiovascular: Negative   Respiratory: Negative   Genitourinary: Per HPI   Gastrointestinal: Per HPI   Integumentary (Skin and/or Breast): Negative   Musculoskeletal: Per HPI   Endocrine/Rheumatologic: Negative   Neurological: Per HPI   Hematology/Lymphatic: Negative    Allergic/Immunologic: Negative   Phychiatric: Negative    Social History     Socioeconomic History    Marital status:      Spouse name: Not on file    Number of children: Not on file    Years of education: Not on file    Highest education level: Not on file   Occupational History    Not on file   Tobacco Use    Smoking status: Never Smoker    Smokeless tobacco: Never Used   Substance and Sexual Activity    Alcohol use: No    Drug use: No    Sexual activity: Not on file   Other Topics Concern    Not on file   Social History Narrative    Not on file     Social Determinants of Health     Financial Resource Strain:     Difficulty of Paying Living Expenses: Not on file   Food Insecurity:     Worried About Running Out of Food in the Last Year: Not on file    Colby of Food in the Last Year: Not on file   Transportation Needs:     Lack of Transportation (Medical): Not on file    Lack of Transportation (Non-Medical):  Not on file   Physical Activity:     Days of Exercise per Week: Not on file    Minutes of Exercise per Session: Not on file   Stress:     Feeling of Stress : Not on file   Social Connections:     Frequency of Communication with Friends and Family: Not on file    Frequency of Social Gatherings with Friends and Family: Not on file    Attends Orthodox Services: Not on file    Active Member of Clubs or Organizations: Not on file    Attends Club or Organization Meetings: Not on file    Marital Status: Not on file   Intimate Partner Violence:     Fear of Current or Ex-Partner: Not on file   [facility] Emotionally Abused: Not on file    Physically Abused: Not on file    Sexually Abused: Not on file   Housing Stability:     Unable to Pay for Housing in the Last Year: Not on file    Number of Places Lived in the Last Year: Not on file    Unstable Housing in the Last Year: Not on file      Allergies   Allergen Reactions    Strawberry Hives      Current Outpatient Medications   Medication Sig    valsartan-hydroCHLOROthiazide (DIOVAN-HCT) 80-12.5 mg per tablet Take 1 Tab by mouth daily.  multivitamin (ONE A DAY) tablet Take 1 Tab by mouth daily.  simvastatin (ZOCOR) 40 mg tablet Take 40 mg by mouth nightly.  metoprolol-XL (TOPROL XL) 100 mg XL tablet Take  by mouth daily.  aspirin delayed-release (ASPIRIN LOW DOSE) 81 mg tablet Take  by mouth daily. No current facility-administered medications for this visit.       PHYSICAL EXAMINATION:  Visit Vitals  Pulse 82   Temp 97.8 °F (36.6 °C) (Temporal)   Ht 5' 3\" (1.6 m)   Wt 137 lb (62.1 kg)   SpO2 98%   BMI 24.27 kg/m²      ORTHO EXAMINATION:  Examination Right knee Left knee   Skin Intact, large ecchymotic area mid tibia to ankle Intact   Range of motion 100-5 120-0   Effusion - -   Medial joint line tenderness + +   Lateral joint line tenderness - -   Popliteal tenderness - -   Osteophytes palpable + +   Camryns - -   Patella crepitus - -   Anterior drawer - -   Lateral laxity - -   Medial laxity - -   Varus deformity - -   Valgus deformity - -   Pretibial edema - -   Calf tenderness - -     TIME OUT:  Chart reviewed for the following:   Sol Calixto MD, have reviewed the History, Physical and updated the Allergic reactions for Anai Qureshi   TIME OUT performed immediately prior to start of procedure:  Sol Calixto MD, have performed the following reviews on Tosha Richter prior to the start of the procedure:          * Patient was identified by name and date of birth   * Agreement on procedure being performed was verified  * Risks and Benefits explained to the patient  * Procedure site verified and marked as necessary  * Patient was positioned for comfort  * Consent was obtained     Time: 11:12 AM     Date of procedure: 6/9/2022  Procedure performed by:  Placido King MD  Ms. Richter tolerated the procedure well with no complications. RADIOGRAPHS:  XR RIGHT KNEE 4/11/22 SHANNON  IMPRESSION:  Three views with bilateral knees on AP view - No fractures, no effusion, severe R>L joint space narrowing, + osteophytes present. Kellgren Hari grade 4, osteopenia. IMPRESSION:      ICD-10-CM ICD-9-CM    1. Primary osteoarthritis of right knee  M17.11 715.16 sodium hyaluronate (SUPARTZ FX/EUFLEXXA/HYALGAN) 10 mg/mL injection syrg 20 mg      DRAIN/INJECT LARGE JOINT/BURSA   2. Chronic pain of right knee  M25.561 719.46 sodium hyaluronate (SUPARTZ FX/EUFLEXXA/HYALGAN) 10 mg/mL injection syrg 20 mg    G89.29 338.29 DRAIN/INJECT LARGE JOINT/BURSA     PLAN: After discussing treatment options, patient's right knee was injected with 2 cc Euflexxa. There is no need for surgery. She will follow up in 1 week for Euflexxa #2.       Scribed by James Askew ([address]) as dictated by Placido King MD none

## 2022-06-13 ENCOUNTER — DOCUMENTATION ONLY (OUTPATIENT)
Dept: ORTHOPEDIC SURGERY | Age: 87
End: 2022-06-13

## 2022-06-16 ENCOUNTER — OFFICE VISIT (OUTPATIENT)
Dept: ORTHOPEDIC SURGERY | Age: 87
End: 2022-06-16
Payer: MEDICARE

## 2022-06-16 VITALS
BODY MASS INDEX: 23.03 KG/M2 | OXYGEN SATURATION: 97 % | WEIGHT: 138.2 LBS | HEIGHT: 65 IN | TEMPERATURE: 96.8 F | HEART RATE: 76 BPM

## 2022-06-16 DIAGNOSIS — G89.29 CHRONIC PAIN OF RIGHT KNEE: Primary | ICD-10-CM

## 2022-06-16 DIAGNOSIS — M17.11 PRIMARY OSTEOARTHRITIS OF RIGHT KNEE: ICD-10-CM

## 2022-06-16 DIAGNOSIS — M25.561 CHRONIC PAIN OF RIGHT KNEE: Primary | ICD-10-CM

## 2022-06-16 PROCEDURE — 20610 DRAIN/INJ JOINT/BURSA W/O US: CPT | Performed by: SPECIALIST

## 2022-06-16 RX ORDER — HYDROCHLOROTHIAZIDE 12.5 MG/1
12.5 CAPSULE ORAL DAILY
COMMUNITY

## 2022-06-16 NOTE — PROGRESS NOTES
Patient: Shay Larson                MRN: [id number]       SSN: [id number]  YOB: 1930        AGE: 80 y.o. SEX: female  Body mass index is 23 kg/m². PCP: Ketty Jacobsen MD  06/16/22    Chief Complaint   Patient presents with    Knee Pain     right     HISTORY:  Shay Larson is a 80 y.o. female who is seen for right knee pain. ICD-10-CM ICD-9-CM    1. Chronic pain of right knee  M25.561 719.46 sodium hyaluronate (SUPARTZ FX/EUFLEXXA/HYALGAN) 10 mg/mL injection syrg 20 mg    G89.29 338.29 DRAIN/INJECT LARGE JOINT/BURSA   2. Primary osteoarthritis of right knee  M17.11 715.16 sodium hyaluronate (SUPARTZ FX/EUFLEXXA/HYALGAN) 10 mg/mL injection syrg 20 mg      DRAIN/INJECT LARGE JOINT/BURSA       Chart reviewed for the following:   Debbie Bragg MD, have reviewed the History, Physical and updated the Allergic reactions for [address] [place] [address]) performed immediately prior to start of procedure:  Debbie Bragg MD, have performed the following reviews on Shay Larson prior to the start of the procedure:            * Patient was identified by name and date of birth   * Agreement on procedure being performed was verified  * Risks and Benefits explained to the patient  * Procedure site verified and marked as necessary  * Patient was positioned for comfort  * Consent was obtained     Time: 11:34 AM     Date of procedure: 6/16/2022    Procedure performed by:  Quique Crabtree MD    Ms. Richter tolerated the procedure well with no complications. PLAN:  After discussing treatment options, patient's right knee was injected with 2 cc of Euflexxa. Ms. Maria Guadalupe Mercado will follow up in one week to complete her visco supplementation injection series.       Scribed by Quique Crabtree MD [facility]) as dictated by Quique Crabtree MD

## 2022-06-23 ENCOUNTER — OFFICE VISIT (OUTPATIENT)
Dept: ORTHOPEDIC SURGERY | Age: 87
End: 2022-06-23
Payer: MEDICARE

## 2022-06-23 VITALS — HEART RATE: 74 BPM | WEIGHT: 137.6 LBS | OXYGEN SATURATION: 97 % | TEMPERATURE: 96.9 F | BODY MASS INDEX: 22.9 KG/M2

## 2022-06-23 DIAGNOSIS — G89.29 CHRONIC PAIN OF RIGHT KNEE: ICD-10-CM

## 2022-06-23 DIAGNOSIS — M25.561 CHRONIC PAIN OF RIGHT KNEE: ICD-10-CM

## 2022-06-23 DIAGNOSIS — M17.11 PRIMARY OSTEOARTHRITIS OF RIGHT KNEE: Primary | ICD-10-CM

## 2022-06-23 PROCEDURE — 20610 DRAIN/INJ JOINT/BURSA W/O US: CPT | Performed by: SPECIALIST

## 2022-06-23 NOTE — PROGRESS NOTES
Patient: Gene Brown                MRN: [id number]       SSN: [id number]  YOB: 1930        AGE: 80 y.o. SEX: female  Body mass index is 22.9 kg/m². PCP: Dov Amaya MD  06/23/22    Chief Complaint   Patient presents with    Knee Pain     right     HISTORY:  Gene Brown is a 80 y.o. female who is seen for right knee pain. TIME OUT performed immediately prior to start of procedure:  Raya El MD, have performed the following reviews on Gene Brown prior to the start of the procedure:            * Patient was identified by name and date of birth   * Agreement on procedure being performed was verified  * Risks and Benefits explained to the patient  * Procedure site verified and marked as necessary  * Patient was positioned for comfort  * Consent was obtained     Time: 11:40 AM     Date of procedure: 6/23/2022    Procedure performed by:  Apoorva Richard MD    Ms. Richter tolerated the procedure well with no complications      [id number] ICD-9-CM    1. Primary osteoarthritis of right knee  M17.11 715.16 sodium hyaluronate (SUPARTZ FX/EUFLEXXA/HYALGAN) 10 mg/mL injection syrg 20 mg      DRAIN/INJECT LARGE JOINT/BURSA   2. Chronic pain of right knee  M25.561 719.46 sodium hyaluronate (SUPARTZ FX/EUFLEXXA/HYALGAN) 10 mg/mL injection syrg 20 mg    G89.29 338.29 DRAIN/INJECT LARGE JOINT/BURSA     PLAN:  After discussing treatment options, patient's right knee was injected with 2 cc of Euflexxa. Ms. Guerita Milan will follow up PRN now that she has completed her visco supplementation injection series.       Scribed by MD Angelica Garcia) as dictated by Apoorva Richard MD

## 2023-01-25 ENCOUNTER — OFFICE VISIT (OUTPATIENT)
Dept: CARDIOLOGY CLINIC | Age: 88
End: 2023-01-25
Payer: MEDICARE

## 2023-01-25 VITALS
SYSTOLIC BLOOD PRESSURE: 136 MMHG | HEIGHT: 65 IN | BODY MASS INDEX: 21.49 KG/M2 | DIASTOLIC BLOOD PRESSURE: 82 MMHG | OXYGEN SATURATION: 100 % | WEIGHT: 129 LBS

## 2023-01-25 DIAGNOSIS — R00.2 PALPITATIONS: ICD-10-CM

## 2023-01-25 DIAGNOSIS — R55 SYNCOPE, UNSPECIFIED SYNCOPE TYPE: Primary | ICD-10-CM

## 2023-01-25 DIAGNOSIS — I49.8 SINUS ARRHYTHMIA: ICD-10-CM

## 2023-01-25 DIAGNOSIS — I10 PRIMARY HYPERTENSION: ICD-10-CM

## 2023-01-25 DIAGNOSIS — I45.10 RBBB: ICD-10-CM

## 2023-01-25 PROCEDURE — 1090F PRES/ABSN URINE INCON ASSESS: CPT | Performed by: INTERNAL MEDICINE

## 2023-01-25 PROCEDURE — 1123F ACP DISCUSS/DSCN MKR DOCD: CPT | Performed by: INTERNAL MEDICINE

## 2023-01-25 PROCEDURE — 99214 OFFICE O/P EST MOD 30 MIN: CPT | Performed by: INTERNAL MEDICINE

## 2023-01-25 PROCEDURE — G8420 CALC BMI NORM PARAMETERS: HCPCS | Performed by: INTERNAL MEDICINE

## 2023-01-25 PROCEDURE — G8427 DOCREV CUR MEDS BY ELIG CLIN: HCPCS | Performed by: INTERNAL MEDICINE

## 2023-01-25 PROCEDURE — G8536 NO DOC ELDER MAL SCRN: HCPCS | Performed by: INTERNAL MEDICINE

## 2023-01-25 PROCEDURE — G8432 DEP SCR NOT DOC, RNG: HCPCS | Performed by: INTERNAL MEDICINE

## 2023-01-25 PROCEDURE — 93000 ELECTROCARDIOGRAM COMPLETE: CPT | Performed by: INTERNAL MEDICINE

## 2023-01-25 PROCEDURE — 1101F PT FALLS ASSESS-DOCD LE1/YR: CPT | Performed by: INTERNAL MEDICINE

## 2023-01-25 NOTE — PROGRESS NOTES
HISTORY OF PRESENT ILLNESS  Deborah Carlson  is a  80 y.o. female     ASSESSMENT and PLAN    Ms. Candi Clarke has history of hypertension and palpitations. she has no complaints of or history of mahendra syncope. She has irregular heartbeats. Her Holter monitor obtained in October of 2014 showed mainly sinus rhythm, with arrhythmia. There were no significant ventricular tachycardic events. There were PVC's and limited SVT's. In February 2017, she presented to the emergency room with dizziness and syncope. She was noted to be dehydrated. Since that time, she has not had any further episodes. For her orthostatic dizziness, I did advise her to be careful and take her time. In January 2020, she presented to the emergency room with a syncopal episode. Apparently, she had been struggling with fevers, chills, nausea and malaise for about a week and was subsequently diagnosed with UTI. According to the daughter, patient was somewhat dehydrated and she subsequently passed out while coming home from PCP visit. She was seen in the emergency room and was subsequently discharged home. Her echocardiogram done in January 2021 showed normal LV function with EF 55-60%, moderate pulmonary hypertension with PA pressure 54 mmHg, mild MR.    CAD:    She has no documented history of significant CAD. BP:    Well controlled at 136/82. Rhythm:    She remains in sinus rhythm with dysrhythmia, rate 63 bpm.  CHF:    There is no evidence of decompensated CHF noted. Weight:     Her weight today is 129 pounds. Her weight in 2022 was 140 pounds. Her baseline weight was 144 pounds. I discussed with her and her daughter at length about importance of maintaining weight. Her daughter who accompanied her is the main caregiver and lives across the street from the patient. Cholesterol:   Target LDL <90. Zocor 40. Anti-platelet:   Remains on ASA. She is doing well from cardiac standpoint.   Her biggest issue right now appears to be poor hearing, and weight loss. Her daughter who is her primary caregiver is pursuing possible options through ENT. I will see her back annually. Thank you. Encounter Diagnoses   Name Primary?  Syncope, unspecified syncope type Yes    RBBB     Sinus arrhythmia     Palpitations     Primary hypertension      current treatment plan is effective, no change in therapy  lab results and schedule of future lab studies reviewed with patient  reviewed diet, exercise and weight control  cardiovascular risk and specific lipid/LDL goals reviewed  use of aspirin to prevent MI and TIA's discussed    Follow-up  Associated symptoms include shortness of breath. Shortness of Breath    Dizziness  Associated symptoms include shortness of breath. Today, Ms. Earnest Lane has no complaints of chest pains, increased shortness of breath or decreased exercise capacity. Despite her age at 80 years, she still lives by herself. Her daughter who is her main caregiver lives right across the street. She denies any complaints of chest pains, or increased dyspnea on exertion. She does have baseline LEMUS. She has poor balance. She uses a walker to ambulate. Unfortunately, she has lost 15 pounds in last 2 years. This is despite the fact that I have been telling the patient to eat more. She likes fruits and vegetables. I have encouraged her to eat more proteins, dairy and carbohydrates. Review of Systems  14 point review of system is negative unless mentioned in HPI    Physical Exam  Vitals and nursing note reviewed. Constitutional:       Appearance: Normal appearance. HENT:      Head: Normocephalic. Eyes:      Conjunctiva/sclera: Conjunctivae normal.   Neck:      Vascular: No carotid bruit. Cardiovascular:      Rate and Rhythm: Normal rate and regular rhythm. Extrasystoles are present. Pulmonary:      Breath sounds: Normal breath sounds. Abdominal:      Palpations: Abdomen is soft.    Musculoskeletal:         General: No swelling. Cervical back: No rigidity. Skin:     General: Skin is warm and dry. Neurological:      General: No focal deficit present. Mental Status: She is alert and oriented to person, place, and time. Psychiatric:         Mood and Affect: Mood normal.         Behavior: Behavior normal.          PCP: Stevenson Pepe MD    Past Medical History:   Diagnosis Date    Atrial arrhythmia     palpitation    Cancer ([facility] [address]     cervical    Echocardiogram 10/01/2014    EF 60%. No WMA. Mild LVH. Indeterminate diastolic fx. RVSP 40 mmHg. Mild LAE. Mild MR.  Mild-mod TR.      Holter monitor study 10/01/2014    24-hr Holter. Sinus rhythm w/change in conduction, avg HR 65 bpm (range 50-96 bpm). Single SVEs - 2590. Four runs of SVT, max 4 beats.  HTN (hypertension)     Joint pain     Palpitations     Pulmonary embolism ([facility] [address] 2002    Vaso vagal episode     Wears eyeglasses        History reviewed. No pertinent surgical history. Current Outpatient Medications   Medication Sig Dispense Refill    hydroCHLOROthiazide (MICROZIDE) 12.5 mg capsule Take 12.5 mg by mouth daily.  valsartan-hydroCHLOROthiazide (DIOVAN-HCT) 80-12.5 mg per tablet Take 1 Tab by mouth daily.  simvastatin (ZOCOR) 40 mg tablet Take 40 mg by mouth nightly.  aspirin delayed-release 81 mg tablet Take 81 mg by mouth daily.  multivitamin (ONE A DAY) tablet Take 1 Tab by mouth daily.  metoprolol succinate (TOPROL-XL) 100 mg tablet Take 100 mg by mouth daily.          The patient has a family history of    Social History     Tobacco Use    Smoking status: Never    Smokeless tobacco: Never   Substance Use Topics    Alcohol use: No    Drug use: No       No results found for: CHOL, CHOLX, CHLST, CHOLV, HDL, HDLP, LDL, LDLC, DLDLP, TGLX, TRIGL, TRIGP, CHHD, CHHDX     BP Readings from Last 3 Encounters:   01/25/23 136/82   01/26/22 132/84   01/14/21 (!) 160/72        Pulse Readings from Last 3 Encounters: 06/23/22 74   06/16/22 76   06/09/22 82       Wt Readings from Last 3 Encounters:   01/25/23 58.5 kg (129 lb)   06/23/22 62.4 kg (137 lb 9.6 oz)   06/16/22 62.7 kg (138 lb 3.2 oz)         EKG: unchanged from previous tracings, sinus rhythm with dysrhythmia, RBBB, PAC/PVC.

## 2023-01-25 NOTE — PROGRESS NOTES
Anais Alfaro presents today for   Chief Complaint   Patient presents with    Follow-up     1 year follow up       Shortness of Breath     With exertion       Dizziness     All the time       Anais Alfaro preferred language for health care discussion is english/other. Is someone accompanying this pt? Yes, daughter     Is the patient using any DME equipment during [address]? no    Depression Screening:  3 most recent PHQ Screens 1/25/2023   Little interest or pleasure in doing things Not at all   Feeling down, depressed, irritable, or hopeless Not at all   Total Score PHQ 2 0       Learning Assessment:  Learning Assessment 1/26/2022   PRIMARY LEARNER Patient   CO-LEARNER CAREGIVER -   PRIMARY LANGUAGE ENGLISH   LEARNER PREFERENCE PRIMARY DEMONSTRATION   ANSWERED BY patient   RELATIONSHIP SELF       Abuse Screening:  Abuse Screening Questionnaire 1/25/2023   Do you ever feel afraid of your partner? N   Are you in a relationship with someone who physically or mentally threatens you? N   Is it safe for you to go home? Y       Fall Risk  Fall Risk Assessment, last 12 mths 1/25/2023   Able to walk? Yes   Fall in past 12 months? 0   Do you feel unsteady? 0   Are you worried about falling 0       Pt currently taking Anticoagulant therapy? no    Coordination of Care:  1. Have you been to the ER, urgent care clinic since your last visit? Hospitalized since your last visit? no    2. Have you seen or consulted any other health care providers outside of the [address] [place] [address] since your last visit? Include any pap smears or colon screening.  no

## 2023-04-26 ENCOUNTER — HOSPITAL ENCOUNTER (OUTPATIENT)
Facility: HOSPITAL | Age: 88
Discharge: HOME | End: 2023-04-29
Payer: MEDICARE

## 2023-04-26 DIAGNOSIS — E55.9 VITAMIN D DEFICIENCY: ICD-10-CM

## 2023-04-26 DIAGNOSIS — I10 HYPERTENSION, UNSPECIFIED TYPE: ICD-10-CM

## 2023-04-26 LAB
25(OH)D3 SERPL-MCNC: 30.6 NG/ML (ref 30–100)
ALBUMIN SERPL-MCNC: 3.9 G/DL (ref 3.4–5)
ALBUMIN/GLOB SERPL: 1.3 (ref 0.8–1.7)
ALP SERPL-CCNC: 79 U/L (ref 45–117)
ALT SERPL-CCNC: 25 U/L (ref 13–56)
ANION GAP SERPL CALC-SCNC: 6 MMOL/L (ref 3–18)
AST SERPL-CCNC: 28 U/L (ref 10–38)
BILIRUB SERPL-MCNC: 0.7 MG/DL (ref 0.2–1)
BUN SERPL-MCNC: 20 MG/DL (ref 7–18)
BUN/CREAT SERPL: 27 (ref 12–20)
CALCIUM SERPL-MCNC: 10.3 MG/DL (ref 8.5–10.1)
CHLORIDE SERPL-SCNC: 103 MMOL/L (ref 100–111)
CO2 SERPL-SCNC: 28 MMOL/L (ref 21–32)
CREAT SERPL-MCNC: 0.74 MG/DL (ref 0.6–1.3)
ERYTHROCYTE [DISTWIDTH] IN BLOOD BY AUTOMATED COUNT: 12.9 % (ref 11.6–14.5)
GLOBULIN SER CALC-MCNC: 3.1 G/DL (ref 2–4)
GLUCOSE SERPL-MCNC: 124 MG/DL (ref 74–99)
HCT VFR BLD AUTO: 39.6 % (ref 35–45)
HGB BLD-MCNC: 13.2 G/DL (ref 12–16)
MCH RBC QN AUTO: 32.1 PG (ref 24–34)
MCHC RBC AUTO-ENTMCNC: 33.3 G/DL (ref 31–37)
MCV RBC AUTO: 96.4 FL (ref 78–100)
NRBC # BLD: 0 K/UL (ref 0–0.01)
NRBC BLD-RTO: 0 PER 100 WBC
PLATELET # BLD AUTO: 172 K/UL (ref 135–420)
PMV BLD AUTO: 10.1 FL (ref 9.2–11.8)
POTASSIUM SERPL-SCNC: 5.2 MMOL/L (ref 3.5–5.5)
PROT SERPL-MCNC: 7 G/DL (ref 6.4–8.2)
RBC # BLD AUTO: 4.11 M/UL (ref 4.2–5.3)
SODIUM SERPL-SCNC: 137 MMOL/L (ref 136–145)
TSH SERPL DL<=0.05 MIU/L-ACNC: 1.89 UIU/ML (ref 0.36–3.74)
WBC # BLD AUTO: 5.8 K/UL (ref 4.6–13.2)

## 2023-04-26 PROCEDURE — 85027 COMPLETE CBC AUTOMATED: CPT

## 2023-04-26 PROCEDURE — 80053 COMPREHEN METABOLIC PANEL: CPT

## 2023-04-26 PROCEDURE — 84443 ASSAY THYROID STIM HORMONE: CPT

## 2023-04-26 PROCEDURE — 36415 COLL VENOUS BLD VENIPUNCTURE: CPT

## 2023-04-26 PROCEDURE — 82306 VITAMIN D 25 HYDROXY: CPT

## 2024-02-02 ENCOUNTER — HOSPITAL ENCOUNTER (INPATIENT)
Facility: HOSPITAL | Age: 89
LOS: 5 days | Discharge: HOME HEALTH | DRG: 308 | End: 2024-02-07
Attending: EMERGENCY MEDICINE | Admitting: EMERGENCY MEDICINE
Payer: MEDICARE

## 2024-02-02 ENCOUNTER — APPOINTMENT (OUTPATIENT)
Facility: HOSPITAL | Age: 89
DRG: 308 | End: 2024-02-02
Payer: MEDICARE

## 2024-02-02 DIAGNOSIS — H10.31 ACUTE CONJUNCTIVITIS OF RIGHT EYE, UNSPECIFIED ACUTE CONJUNCTIVITIS TYPE: ICD-10-CM

## 2024-02-02 DIAGNOSIS — R55 SYNCOPE AND COLLAPSE: Primary | ICD-10-CM

## 2024-02-02 DIAGNOSIS — N30.00 ACUTE CYSTITIS WITHOUT HEMATURIA: ICD-10-CM

## 2024-02-02 DIAGNOSIS — I48.91 ATRIAL FIBRILLATION WITH RVR (HCC): ICD-10-CM

## 2024-02-02 DIAGNOSIS — I48.91 ATRIAL FIBRILLATION WITH RAPID VENTRICULAR RESPONSE (HCC): ICD-10-CM

## 2024-02-02 LAB
ANION GAP SERPL CALC-SCNC: 7 MMOL/L (ref 3–18)
APPEARANCE UR: ABNORMAL
BACTERIA URNS QL MICRO: ABNORMAL /HPF
BASOPHILS # BLD: 0 K/UL (ref 0–0.1)
BASOPHILS NFR BLD: 0 % (ref 0–2)
BILIRUB UR QL: NEGATIVE
BUN SERPL-MCNC: 13 MG/DL (ref 7–18)
BUN/CREAT SERPL: 17 (ref 12–20)
CALCIUM SERPL-MCNC: 8.4 MG/DL (ref 8.5–10.1)
CHLORIDE SERPL-SCNC: 95 MMOL/L (ref 100–111)
CO2 SERPL-SCNC: 25 MMOL/L (ref 21–32)
COLOR UR: ABNORMAL
CREAT SERPL-MCNC: 0.75 MG/DL (ref 0.6–1.3)
D DIMER PPP FEU-MCNC: 1.83 UG/ML(FEU)
DIFFERENTIAL METHOD BLD: ABNORMAL
EOSINOPHIL # BLD: 0 K/UL (ref 0–0.4)
EOSINOPHIL NFR BLD: 0 % (ref 0–5)
EPITH CASTS URNS QL MICRO: ABNORMAL /LPF (ref 0–5)
ERYTHROCYTE [DISTWIDTH] IN BLOOD BY AUTOMATED COUNT: 12.1 % (ref 11.6–14.5)
GLUCOSE SERPL-MCNC: 110 MG/DL (ref 74–99)
GLUCOSE UR STRIP.AUTO-MCNC: NEGATIVE MG/DL
HCT VFR BLD AUTO: 34.7 % (ref 35–45)
HGB BLD-MCNC: 12 G/DL (ref 12–16)
HGB UR QL STRIP: ABNORMAL
HYALINE CASTS URNS QL MICRO: ABNORMAL /LPF (ref 0–2)
IMM GRANULOCYTES # BLD AUTO: 0 K/UL (ref 0–0.04)
IMM GRANULOCYTES NFR BLD AUTO: 0 % (ref 0–0.5)
KETONES UR QL STRIP.AUTO: 15 MG/DL
LACTATE SERPL-SCNC: 1.1 MMOL/L (ref 0.4–2)
LACTATE SERPL-SCNC: 1.8 MMOL/L (ref 0.4–2)
LEUKOCYTE ESTERASE UR QL STRIP.AUTO: ABNORMAL
LYMPHOCYTES # BLD: 0.5 K/UL (ref 0.9–3.6)
LYMPHOCYTES NFR BLD: 5 % (ref 21–52)
MAGNESIUM SERPL-MCNC: 1.7 MG/DL (ref 1.6–2.6)
MCH RBC QN AUTO: 31.8 PG (ref 24–34)
MCHC RBC AUTO-ENTMCNC: 34.6 G/DL (ref 31–37)
MCV RBC AUTO: 92 FL (ref 78–100)
MONOCYTES # BLD: 0.6 K/UL (ref 0.05–1.2)
MONOCYTES NFR BLD: 7 % (ref 3–10)
MUCOUS THREADS URNS QL MICRO: ABNORMAL /LPF
NEUTS SEG # BLD: 8 K/UL (ref 1.8–8)
NEUTS SEG NFR BLD: 88 % (ref 40–73)
NITRITE UR QL STRIP.AUTO: NEGATIVE
NRBC # BLD: 0 K/UL (ref 0–0.01)
NRBC BLD-RTO: 0 PER 100 WBC
NT PRO BNP: 1314 PG/ML (ref 0–1800)
PH UR STRIP: 5.5 (ref 5–8)
PLATELET # BLD AUTO: 156 K/UL (ref 135–420)
PLATELET COMMENT: ABNORMAL
PMV BLD AUTO: 11.1 FL (ref 9.2–11.8)
POTASSIUM SERPL-SCNC: 3.9 MMOL/L (ref 3.5–5.5)
PROT UR STRIP-MCNC: 30 MG/DL
RBC # BLD AUTO: 3.77 M/UL (ref 4.2–5.3)
RBC #/AREA URNS HPF: ABNORMAL /HPF (ref 0–5)
RBC MORPH BLD: ABNORMAL
SODIUM SERPL-SCNC: 127 MMOL/L (ref 136–145)
SP GR UR REFRACTOMETRY: 1.02 (ref 1–1.03)
TROPONIN I SERPL HS-MCNC: 20 NG/L (ref 0–54)
UROBILINOGEN UR QL STRIP.AUTO: 1 EU/DL (ref 0.2–1)
WBC # BLD AUTO: 9.1 K/UL (ref 4.6–13.2)
WBC URNS QL MICRO: ABNORMAL /HPF (ref 0–4)

## 2024-02-02 PROCEDURE — 6360000002 HC RX W HCPCS: Performed by: EMERGENCY MEDICINE

## 2024-02-02 PROCEDURE — 83935 ASSAY OF URINE OSMOLALITY: CPT

## 2024-02-02 PROCEDURE — 83880 ASSAY OF NATRIURETIC PEPTIDE: CPT

## 2024-02-02 PROCEDURE — 36569 INSJ PICC 5 YR+ W/O IMAGING: CPT

## 2024-02-02 PROCEDURE — 96374 THER/PROPH/DIAG INJ IV PUSH: CPT

## 2024-02-02 PROCEDURE — 85379 FIBRIN DEGRADATION QUANT: CPT

## 2024-02-02 PROCEDURE — 82570 ASSAY OF URINE CREATININE: CPT

## 2024-02-02 PROCEDURE — 6360000004 HC RX CONTRAST MEDICATION: Performed by: EMERGENCY MEDICINE

## 2024-02-02 PROCEDURE — 1100000000 HC RM PRIVATE

## 2024-02-02 PROCEDURE — 80048 BASIC METABOLIC PNL TOTAL CA: CPT

## 2024-02-02 PROCEDURE — 2580000003 HC RX 258: Performed by: EMERGENCY MEDICINE

## 2024-02-02 PROCEDURE — 73562 X-RAY EXAM OF KNEE 3: CPT

## 2024-02-02 PROCEDURE — 93005 ELECTROCARDIOGRAM TRACING: CPT | Performed by: PHYSICIAN ASSISTANT

## 2024-02-02 PROCEDURE — 83605 ASSAY OF LACTIC ACID: CPT

## 2024-02-02 PROCEDURE — 83735 ASSAY OF MAGNESIUM: CPT

## 2024-02-02 PROCEDURE — 2500000003 HC RX 250 WO HCPCS: Performed by: EMERGENCY MEDICINE

## 2024-02-02 PROCEDURE — 85025 COMPLETE CBC W/AUTO DIFF WBC: CPT

## 2024-02-02 PROCEDURE — 84443 ASSAY THYROID STIM HORMONE: CPT

## 2024-02-02 PROCEDURE — 99285 EMERGENCY DEPT VISIT HI MDM: CPT

## 2024-02-02 PROCEDURE — 71275 CT ANGIOGRAPHY CHEST: CPT

## 2024-02-02 PROCEDURE — 02HV33Z INSERTION OF INFUSION DEVICE INTO SUPERIOR VENA CAVA, PERCUTANEOUS APPROACH: ICD-10-PCS | Performed by: EMERGENCY MEDICINE

## 2024-02-02 PROCEDURE — 84300 ASSAY OF URINE SODIUM: CPT

## 2024-02-02 PROCEDURE — 71045 X-RAY EXAM CHEST 1 VIEW: CPT

## 2024-02-02 PROCEDURE — 84484 ASSAY OF TROPONIN QUANT: CPT

## 2024-02-02 PROCEDURE — 81001 URINALYSIS AUTO W/SCOPE: CPT

## 2024-02-02 RX ORDER — HEPARIN SODIUM 10000 [USP'U]/100ML
5-30 INJECTION, SOLUTION INTRAVENOUS CONTINUOUS
Status: DISCONTINUED | OUTPATIENT
Start: 2024-02-02 | End: 2024-02-05

## 2024-02-02 RX ORDER — METOPROLOL TARTRATE 1 MG/ML
5 INJECTION, SOLUTION INTRAVENOUS ONCE
Status: DISCONTINUED | OUTPATIENT
Start: 2024-02-02 | End: 2024-02-02

## 2024-02-02 RX ORDER — 0.9 % SODIUM CHLORIDE 0.9 %
500 INTRAVENOUS SOLUTION INTRAVENOUS ONCE
Status: COMPLETED | OUTPATIENT
Start: 2024-02-02 | End: 2024-02-03

## 2024-02-02 RX ORDER — DILTIAZEM HYDROCHLORIDE 5 MG/ML
20 INJECTION INTRAVENOUS ONCE
Status: COMPLETED | OUTPATIENT
Start: 2024-02-02 | End: 2024-02-02

## 2024-02-02 RX ORDER — 0.9 % SODIUM CHLORIDE 0.9 %
1000 INTRAVENOUS SOLUTION INTRAVENOUS ONCE
Status: DISCONTINUED | OUTPATIENT
Start: 2024-02-02 | End: 2024-02-07 | Stop reason: HOSPADM

## 2024-02-02 RX ORDER — DILTIAZEM HYDROCHLORIDE 5 MG/ML
10 INJECTION INTRAVENOUS ONCE
Status: COMPLETED | OUTPATIENT
Start: 2024-02-02 | End: 2024-02-02

## 2024-02-02 RX ORDER — AZITHROMYCIN 250 MG/1
500 TABLET, FILM COATED ORAL ONCE
Status: COMPLETED | OUTPATIENT
Start: 2024-02-03 | End: 2024-02-03

## 2024-02-02 RX ORDER — ERYTHROMYCIN 5 MG/G
OINTMENT OPHTHALMIC ONCE
Status: COMPLETED | OUTPATIENT
Start: 2024-02-03 | End: 2024-02-03

## 2024-02-02 RX ORDER — HEPARIN SODIUM 1000 [USP'U]/ML
60 INJECTION, SOLUTION INTRAVENOUS; SUBCUTANEOUS PRN
Status: DISCONTINUED | OUTPATIENT
Start: 2024-02-02 | End: 2024-02-05

## 2024-02-02 RX ORDER — HEPARIN SODIUM 1000 [USP'U]/ML
30 INJECTION, SOLUTION INTRAVENOUS; SUBCUTANEOUS PRN
Status: DISCONTINUED | OUTPATIENT
Start: 2024-02-02 | End: 2024-02-05

## 2024-02-02 RX ORDER — HEPARIN SODIUM 1000 [USP'U]/ML
60 INJECTION, SOLUTION INTRAVENOUS; SUBCUTANEOUS ONCE
Status: COMPLETED | OUTPATIENT
Start: 2024-02-02 | End: 2024-02-03

## 2024-02-02 RX ADMIN — DILTIAZEM HYDROCHLORIDE 10 MG: 5 INJECTION, SOLUTION INTRAVENOUS at 19:43

## 2024-02-02 RX ADMIN — WATER 1000 MG: 1 INJECTION INTRAMUSCULAR; INTRAVENOUS; SUBCUTANEOUS at 21:22

## 2024-02-02 RX ADMIN — IOPAMIDOL 90 ML: 755 INJECTION, SOLUTION INTRAVENOUS at 22:40

## 2024-02-02 RX ADMIN — SODIUM CHLORIDE 500 ML: 9 INJECTION, SOLUTION INTRAVENOUS at 19:37

## 2024-02-02 RX ADMIN — DILTIAZEM HYDROCHLORIDE 20 MG: 5 INJECTION, SOLUTION INTRAVENOUS at 21:30

## 2024-02-02 ASSESSMENT — PAIN - FUNCTIONAL ASSESSMENT: PAIN_FUNCTIONAL_ASSESSMENT: NONE - DENIES PAIN

## 2024-02-02 NOTE — ED TRIAGE NOTES
Pt  arrives via EMS from home c/o syncopal episode - witnessed.   Nausea- zofran IV and  cc.  . A&O x4.Pauma  Hx syncope

## 2024-02-03 ENCOUNTER — APPOINTMENT (OUTPATIENT)
Facility: HOSPITAL | Age: 89
DRG: 308 | End: 2024-02-03
Payer: MEDICARE

## 2024-02-03 ENCOUNTER — APPOINTMENT (OUTPATIENT)
Facility: HOSPITAL | Age: 89
DRG: 308 | End: 2024-02-03
Attending: EMERGENCY MEDICINE
Payer: MEDICARE

## 2024-02-03 PROBLEM — H91.90 HEARING LOSS: Status: ACTIVE | Noted: 2024-02-03

## 2024-02-03 PROBLEM — R55 SYNCOPE AND COLLAPSE: Status: ACTIVE | Noted: 2020-01-14

## 2024-02-03 PROBLEM — Z86.73 HISTORY OF STROKE: Status: ACTIVE | Noted: 2024-02-03

## 2024-02-03 PROBLEM — R82.71 ASYMPTOMATIC BACTERIURIA: Status: ACTIVE | Noted: 2024-02-03

## 2024-02-03 PROBLEM — E87.1 HYPONATREMIA: Status: ACTIVE | Noted: 2024-02-03

## 2024-02-03 LAB
ANION GAP SERPL CALC-SCNC: 7 MMOL/L (ref 3–18)
APTT PPP: 100.4 SEC (ref 23–36.4)
APTT PPP: 34 SEC (ref 23–36.4)
APTT PPP: 82.7 SEC (ref 23–36.4)
BUN SERPL-MCNC: 10 MG/DL (ref 7–18)
BUN/CREAT SERPL: 15 (ref 12–20)
CALCIUM SERPL-MCNC: 8.4 MG/DL (ref 8.5–10.1)
CHLORIDE SERPL-SCNC: 98 MMOL/L (ref 100–111)
CO2 SERPL-SCNC: 26 MMOL/L (ref 21–32)
CREAT SERPL-MCNC: 0.68 MG/DL (ref 0.6–1.3)
CREAT UR-MCNC: 152 MG/DL (ref 30–125)
EKG ATRIAL RATE: 122 BPM
EKG DIAGNOSIS: NORMAL
EKG P AXIS: 112 DEGREES
EKG P-R INTERVAL: 216 MS
EKG Q-T INTERVAL: 308 MS
EKG QRS DURATION: 126 MS
EKG QTC CALCULATION (BAZETT): 438 MS
EKG R AXIS: -73 DEGREES
EKG T AXIS: 66 DEGREES
EKG VENTRICULAR RATE: 122 BPM
GLUCOSE SERPL-MCNC: 94 MG/DL (ref 74–99)
OSMOLALITY UR: 525 MOSM/KG H2O
POTASSIUM SERPL-SCNC: 3.4 MMOL/L (ref 3.5–5.5)
SODIUM SERPL-SCNC: 131 MMOL/L (ref 136–145)
SODIUM UR-SCNC: 26 MMOL/L (ref 20–110)
TROPONIN I SERPL HS-MCNC: 22 NG/L (ref 0–54)
TROPONIN I SERPL HS-MCNC: 23 NG/L (ref 0–54)
TSH SERPL DL<=0.05 MIU/L-ACNC: 0.53 UIU/ML (ref 0.36–3.74)

## 2024-02-03 PROCEDURE — 92610 EVALUATE SWALLOWING FUNCTION: CPT

## 2024-02-03 PROCEDURE — 94761 N-INVAS EAR/PLS OXIMETRY MLT: CPT

## 2024-02-03 PROCEDURE — 87040 BLOOD CULTURE FOR BACTERIA: CPT

## 2024-02-03 PROCEDURE — 2500000003 HC RX 250 WO HCPCS: Performed by: EMERGENCY MEDICINE

## 2024-02-03 PROCEDURE — 2500000003 HC RX 250 WO HCPCS: Performed by: INTERNAL MEDICINE

## 2024-02-03 PROCEDURE — 6360000002 HC RX W HCPCS: Performed by: EMERGENCY MEDICINE

## 2024-02-03 PROCEDURE — 2580000003 HC RX 258: Performed by: INTERNAL MEDICINE

## 2024-02-03 PROCEDURE — 99232 SBSQ HOSP IP/OBS MODERATE 35: CPT | Performed by: INTERNAL MEDICINE

## 2024-02-03 PROCEDURE — 99223 1ST HOSP IP/OBS HIGH 75: CPT | Performed by: INTERNAL MEDICINE

## 2024-02-03 PROCEDURE — 6370000000 HC RX 637 (ALT 250 FOR IP): Performed by: EMERGENCY MEDICINE

## 2024-02-03 PROCEDURE — 93306 TTE W/DOPPLER COMPLETE: CPT

## 2024-02-03 PROCEDURE — 2580000003 HC RX 258: Performed by: EMERGENCY MEDICINE

## 2024-02-03 PROCEDURE — 1100000003 HC PRIVATE W/ TELEMETRY

## 2024-02-03 PROCEDURE — 84484 ASSAY OF TROPONIN QUANT: CPT

## 2024-02-03 PROCEDURE — 70450 CT HEAD/BRAIN W/O DYE: CPT

## 2024-02-03 PROCEDURE — 80048 BASIC METABOLIC PNL TOTAL CA: CPT

## 2024-02-03 PROCEDURE — 85730 THROMBOPLASTIN TIME PARTIAL: CPT

## 2024-02-03 PROCEDURE — 6370000000 HC RX 637 (ALT 250 FOR IP): Performed by: INTERNAL MEDICINE

## 2024-02-03 PROCEDURE — 93010 ELECTROCARDIOGRAM REPORT: CPT | Performed by: INTERNAL MEDICINE

## 2024-02-03 PROCEDURE — 36415 COLL VENOUS BLD VENIPUNCTURE: CPT

## 2024-02-03 PROCEDURE — 99223 1ST HOSP IP/OBS HIGH 75: CPT | Performed by: EMERGENCY MEDICINE

## 2024-02-03 RX ORDER — SODIUM CHLORIDE 9 MG/ML
INJECTION, SOLUTION INTRAVENOUS CONTINUOUS
Status: DISPENSED | OUTPATIENT
Start: 2024-02-03 | End: 2024-02-04

## 2024-02-03 RX ORDER — SODIUM CHLORIDE 0.9 % (FLUSH) 0.9 %
5-40 SYRINGE (ML) INJECTION PRN
Status: DISCONTINUED | OUTPATIENT
Start: 2024-02-03 | End: 2024-02-07 | Stop reason: HOSPADM

## 2024-02-03 RX ORDER — SODIUM CHLORIDE 9 MG/ML
INJECTION, SOLUTION INTRAVENOUS PRN
Status: DISCONTINUED | OUTPATIENT
Start: 2024-02-03 | End: 2024-02-07 | Stop reason: HOSPADM

## 2024-02-03 RX ORDER — AZITHROMYCIN 250 MG/1
500 TABLET, FILM COATED ORAL EVERY 24 HOURS
Status: DISCONTINUED | OUTPATIENT
Start: 2024-02-03 | End: 2024-02-04

## 2024-02-03 RX ORDER — ACETAMINOPHEN 325 MG/1
650 TABLET ORAL EVERY 6 HOURS PRN
Status: DISCONTINUED | OUTPATIENT
Start: 2024-02-03 | End: 2024-02-07 | Stop reason: HOSPADM

## 2024-02-03 RX ORDER — POLYETHYLENE GLYCOL 3350 17 G/17G
17 POWDER, FOR SOLUTION ORAL DAILY PRN
Status: DISCONTINUED | OUTPATIENT
Start: 2024-02-03 | End: 2024-02-07 | Stop reason: HOSPADM

## 2024-02-03 RX ORDER — SODIUM CHLORIDE 1 G/1
1 TABLET ORAL
Status: DISCONTINUED | OUTPATIENT
Start: 2024-02-03 | End: 2024-02-07 | Stop reason: HOSPADM

## 2024-02-03 RX ORDER — SODIUM CHLORIDE 0.9 % (FLUSH) 0.9 %
5-40 SYRINGE (ML) INJECTION EVERY 12 HOURS SCHEDULED
Status: DISCONTINUED | OUTPATIENT
Start: 2024-02-03 | End: 2024-02-07 | Stop reason: HOSPADM

## 2024-02-03 RX ORDER — ACETAMINOPHEN 650 MG/1
650 SUPPOSITORY RECTAL EVERY 6 HOURS PRN
Status: DISCONTINUED | OUTPATIENT
Start: 2024-02-03 | End: 2024-02-07 | Stop reason: HOSPADM

## 2024-02-03 RX ADMIN — ERYTHROMYCIN: 5 OINTMENT OPHTHALMIC at 01:48

## 2024-02-03 RX ADMIN — HEPARIN SODIUM AND DEXTROSE 12 UNITS/KG/HR: 10000; 5 INJECTION INTRAVENOUS at 08:56

## 2024-02-03 RX ADMIN — SODIUM CHLORIDE 10 MG/HR: 900 INJECTION, SOLUTION INTRAVENOUS at 14:00

## 2024-02-03 RX ADMIN — SODIUM CHLORIDE, PRESERVATIVE FREE 10 ML: 5 INJECTION INTRAVENOUS at 20:35

## 2024-02-03 RX ADMIN — HEPARIN SODIUM 3510 UNITS: 1000 INJECTION INTRAVENOUS; SUBCUTANEOUS at 01:59

## 2024-02-03 RX ADMIN — Medication 1 G: at 17:56

## 2024-02-03 RX ADMIN — AZITHROMYCIN DIHYDRATE 500 MG: 250 TABLET ORAL at 23:42

## 2024-02-03 RX ADMIN — SODIUM CHLORIDE 2.5 MG/HR: 900 INJECTION, SOLUTION INTRAVENOUS at 01:41

## 2024-02-03 RX ADMIN — WATER 1000 MG: 1 INJECTION INTRAMUSCULAR; INTRAVENOUS; SUBCUTANEOUS at 20:35

## 2024-02-03 RX ADMIN — HEPARIN SODIUM AND DEXTROSE 12 UNITS/KG/HR: 10000; 5 INJECTION INTRAVENOUS at 02:05

## 2024-02-03 RX ADMIN — SODIUM CHLORIDE 7.5 MG/HR: 900 INJECTION, SOLUTION INTRAVENOUS at 09:00

## 2024-02-03 RX ADMIN — SODIUM CHLORIDE 10 MG/HR: 900 INJECTION, SOLUTION INTRAVENOUS at 09:33

## 2024-02-03 RX ADMIN — AZITHROMYCIN DIHYDRATE 500 MG: 250 TABLET ORAL at 01:56

## 2024-02-03 RX ADMIN — Medication 1 G: at 14:32

## 2024-02-03 RX ADMIN — ACETAMINOPHEN 325MG 650 MG: 325 TABLET ORAL at 14:17

## 2024-02-03 ASSESSMENT — PAIN SCALES - GENERAL: PAINLEVEL_OUTOF10: 3

## 2024-02-03 ASSESSMENT — PAIN DESCRIPTION - DESCRIPTORS: DESCRIPTORS: ACHING

## 2024-02-03 ASSESSMENT — PAIN DESCRIPTION - LOCATION: LOCATION: BACK

## 2024-02-03 NOTE — PROGRESS NOTES
completed the initial Spiritual Assessment of the patient, and offered Pastoral Care support to the patient in bed 20 of the emergency room where she will be admitted to the hospital due to A-FIB There is no advance directive on file. Patient does not have any [place]ian/cultural needs that will affect patient’s preferences in health care. Chaplains will continue to follow and will provide pastoral care on an as needed/requested basis.    Chaplain Marquez Kulkarni  Board Certified   Spiritual Care Department  [phone]

## 2024-02-03 NOTE — ED NOTES
Stopped titratable Diltiazem order per Nursing Sup and started the 7mg/hr order so patient could get a bed.

## 2024-02-03 NOTE — ED PROVIDER NOTES
EMERGENCY DEPARTMENT HISTORY AND PHYSICAL EXAM      Date: 2/2/2024  Patient Name: Eda Quinn      History of Presenting Illness     Chief Complaint   Patient presents with    Loss of Consciousness       Location/Duration/Severity/Modifying factors   Chief Complaint   Patient presents with    Loss of Consciousness       HPI:  Eda Quinn is a 89 y.o. female with PMH significant for atrial arrhythmia, hypertension, pulmonary embolism with IVC filter in 2000 presents with recurrent syncopal episode this afternoon.  Concern for the daughters that she did not recover quite quickly.  She was only unconscious for a few seconds but took longer for her to return to baseline.  Has had recurrent issues with this in the past with no clear etiology.  To the daughter's knowledge, is never been diagnosed with atrial fibrillation however.  Patient unable to provide any history other than tell the daughter she has not been feeling well for the past 4 days, weaker and more fatigued.  Daughter is concerned for UTI.    PCP: Roshni Archer MD    Current Facility-Administered Medications   Medication Dose Route Frequency Provider Last Rate Last Admin    ciprofloxacin-hydrocortisone (CIPRO HC OTIC) otic suspension 3 drop  3 drop Right Ear Once Kranthi Garibay DO        sodium chloride 0.9 % bolus 1,000 mL  1,000 mL IntraVENous Once Kranthi Garibay DO        dilTIAZem 100 mg in sodium chloride 0.9 % 100 mL infusion (ADD-Fordyce)  2.5-15 mg/hr IntraVENous NOW Kranthi Garibay DO        heparin (porcine) injection 3,510 Units  60 Units/kg IntraVENous Once Kranthi Garibay DO        heparin (porcine) injection 3,510 Units  60 Units/kg IntraVENous PRN Kranthi Garibay DO        heparin (porcine) injection 1,760 Units  30 Units/kg IntraVENous PRN Kranthi Garibay DO        heparin 25,000 units in dextrose 5% 250 mL (premix) infusion  5-30 Units/kg/hr IntraVENous Continuous Kranthi Garibay DO        [START ON 2/3/2024] azithromycin

## 2024-02-03 NOTE — PLAN OF CARE
Problem: SLP Adult - Impaired Swallowing  Goal: By Discharge: Advance to least restrictive diet without signs or symptoms of aspiration for planned discharge setting.  See evaluation for individualized goals.  Description: Patient will:  1. Tolerate PO trials with 0 s/s overt distress in 4/5 trials  2. Utilize compensatory swallow strategies/maneuvers (decrease bite/sip, size/rate, alt. liq/sol) with min cues in 4/5 trials      Rec:     Regular diet with thin liquids  Aspiration precautions  HOB >45 during po intake, remain >30 for 30-45 minutes after po   Small bites/sips; alternate liquid/solid with slow feeding rate   Oral care TID  Meds per pt preference      Outcome: Progressing   SPEECH LANGUAGE PATHOLOGY BEDSIDE SWALLOW EVALUATION/TREATMENT    Patient: Eda Quinn (89 y.o. female)  Date: 2/3/2024  Primary Diagnosis: Syncope and collapse [R55]  Atrial fibrillation with rapid ventricular response (HCC) [I48.91]  Atrial fibrillation with RVR (HCC) [I48.91]  Acute cystitis without hematuria [N30.00]  Acute conjunctivitis of right eye, unspecified acute conjunctivitis type [H10.31]       Precautions: Aspiration  PLOF: As per H&P  ASSESSMENT:  Based on the objective data described below, the patient presents with oropharyngeal swallow fxn essentially WFL. Strength, ROM, and coordination of the orofacial musculature were all found to be WFL. Pt tolerated reg solid, puree, and thin liquids +/- straw consecutive swallows without any overt s/sx of aspiration. Minimal lingual spread noted post trials of reg solids cleared with liquid wash. Laryngeal elevation was functional/timely to palpation with all PO trials. Pt safe for initiation of reg solid diet with thin liquids, aspiration precautions, oral care TID, and meds as tolerated. ST will continue to follow x 1-2 visits to ensure safety of diet tolerance.    TREATMENT:  Skilled therapy initiated; Educated patient on aspiration precautions and importance of       Aspiration precautions; swallow safety; compensatory techniques provided via demonstration, verbalization and teach back of comprehension  []         Patient/family have participated as able in goal setting and plan of care.  []            Patient/family agree to work toward stated goals and plan of care.  []            Patient understands intent and goals of therapy, neutral about participation.  []            Patient unable to participate in goal setting/plan of care secondary to cognition, hearing/vision deficits; education ongoing with interdisciplinary staff   []            Handout regarding diet recommendations and thickener instructions provided.  []         Posted safety precautions in patient's room.    Thank you for this referral.    Ameena Ellison M.S. CCC-SLP  Speech Language Pathologist

## 2024-02-03 NOTE — H&P
Hospitalist Admission History and Physical    NAME:  Eda Quinn   :   10/6/1930   MRN:   [id number]     PCP:  Roshni Archer MD  Date/Time:  2/3/2024 4:15 AM  Subjective:   CHIEF COMPLAINT: Syncope    HISTORY OF PRESENT ILLNESS:     This is a 89-year-old female with a history of hypertension who presented to the ED after she had a recurrent syncopal episode in the afternoon.  History is obtained from talking to the patient and from review of the chart and talking to the ED physician.  Patient is a poor historian.  I did try to call the patient's daughter but was not able to talk to her.  In the ED patient was noted to have atrial fibrillation with RVR.  Patient was also noted to have a UTI and pneumonia.  When I saw the patient she was laying in bed.  Patient is hard of hearing.  Patient is a poor historian.  Patient appears comfortable at this time.  Patient denies any chest pain or shortness of breath at this time.  Patient denies any dizziness.  Patient does not provide much details.    Past Medical History:   Diagnosis Date    Atrial arrhythmia     palpitation    Cancer (HCC)     cervical    Echocardiogram abnormal 10/01/2014    EF 60%.  No WMA.  Mild LVH.  Indeterminate diastolic fx.  RVSP 40 mmHg.  Mild LAE.  Mild MR.  Mild-mod TR.      Holter monitor, abnormal 10/01/2014    24-hr Holter.  Sinus rhythm w/change in conduction, avg HR 65 bpm (range 50-96 bpm).  Single SVEs - 2590.  Four runs of SVT, max 4 beats.    HTN (hypertension)     Joint pain     Palpitations     Pulmonary embolism (HCC)     Vaso vagal episode     Wears eyeglasses         No past surgical history on file.    Social History     Tobacco Use    Smoking status: Never    Smokeless tobacco: Never   Substance Use Topics    Alcohol use: No        No family history on file.     Allergies   Allergen Reactions    Reading Hives        Prior to Admission medications    Medication Sig Start Date End Date Taking? Authorizing Provider

## 2024-02-03 NOTE — PROGRESS NOTES
1130-Received patient from ED, oriented to room, call bell within reach, initial assessment completed, tele-box applied.  1600-no change in condition.

## 2024-02-03 NOTE — ED NOTES
Assumed care of patient from TANIA Cheatham.  Patient A&OX4, laying in bed ia a position of comfort.

## 2024-02-03 NOTE — ED NOTES
Called [facility] south to provide report for patient transfer to inpatient unit. Report received by RN assuming care. Pt transported in bed on monitor due to cardiac medication infusion. Patient was resting comfortably A&O for transfer.

## 2024-02-03 NOTE — CONSULTS
[facility] - Consult Note    Cardiology consultation request from Dr. Garibay for evaluation and management/treatment of STANLEY Arias RVR    Date of  Admission: 2/2/2024  5:55 PM   Primary Care Physician:  Roshni Archer MD    Attending Cardiologist: Dr. Ureña       Assessment:     Patient Active Problem List    Diagnosis Date Noted    Atrial fibrillation with RVR ([facility], located within [facility]) 02/02/2024    Syncope 01/14/2020    Sinus arrhythmia 12/03/2015    RBBB 09/22/2014    Palpitations     HTN (hypertension)        - Atrial fibrillation with RVR, newly diagnosed: Rate control with diltiazem gtt. AC with heparin gtt.  - Syncope: primary reason for admission  - Pneumonia: on IV Abx  - Suspected UTI: on IV abx  - Hyponatremia: Na  127  - Thyroid nodule: US ordered  - Chronic RBBB  - Echo 1/14/2021 EF 55-60%, normal wall motion, mod PAH  - HTN  - HLD  - Palpitations  - History of pulmonary embolism s/p IVC filter in 2000  - Hard of hearing    Primary cardiologist: Dr. Ureña     Plan:     Addendum: Independently seen and evaluated.  Agree with below.  Patient has newly diagnosed atrial fibrillation.  This may be brought on by current infection.  Nevertheless, would continue rate control.  If she becomes unstable, consideration can be given for cardioversion.  Would continue anticoagulation while she is inpatient.  Because of her age and unsteady gait, she may not be the best candidate for long-term OAC.  Will review echocardiogram when available.    - Atrial fibrillation rates fairly controlled. Continue diltiazem gtt for rate control and heparin gtt for AC. ChadsVasc score 4. Hesitant to start OAC in patient with frequent falls and syncopal episodes.   - Echo ordered to assess LV function and structural heart.   - Agree with checking orthostatics for syncopal episode   - PNA and UTI treatment per primary     History of Present Illness:     This is a 89 y.o. female admitted for Atrial fibrillation with RVR ([facility]) [I48.91].   fascicular block  Bifascicular block  T wave abnormality, consider lateral ischemia  Abnormal ECG  When compared with ECG of 14-JAN-2020 15:12,  Significant changes have occurred       01/05/21 01/14/2021 12:45 PM, 01/14/2021 12:00 AM (Final)    Narrative  This is a summary report. The complete report is available in the patient's medical record. If you cannot access the medical record, please contact the sending organization for a detailed fax or copy.    · LV: Estimated LVEF is 55 - 60%. Visually measured ejection fraction. Normal cavity size, wall thickness and systolic function (ejection fraction normal). Wall motion: normal. Inconclusive left ventricular diastolic function.  · PA: Moderate pulmonary hypertension. Pulmonary arterial systolic pressure is 54 mmHg.  · TV: Mild to moderate tricuspid valve regurgitation is present.  · LA: Severely dilated left atrium. Left Atrium volume index is 58 mL/m2.  · RV: Mildly dilated right ventricle.  · RA: Dilated right atrium.  · MV: Moderate mitral annular calcification. Mild mitral valve regurgitation is present.    Signed by: Michele Erickson MD on 1/14/2021 12:45 PM, Signed by: Unknown Provider Result on 1/14/2021 12:00 AM    No results found for this or any previous visit.    No results found for this or any previous visit.    Xray Result (most recent):  XR KNEE RIGHT (3 VIEWS) 02/02/2024    Narrative  EXAMINATION: Right knee 3 views    INDICATION: Right knee pain, swelling    COMPARISON: None    FINDINGS: 3 views. No acute fracture or subluxation identified. Medial  compartment joint space loss. Marginal osteophytes. Faint chondrocalcinosis. No  significant joint effusion. Superior patellar enthesophyte.    Impression  Advanced degenerative changes most pronounced in the medial compartment. No  clearly acute findings.      Signed By: Marge Zaragoza, APRN - NP     February 3, 2024

## 2024-02-03 NOTE — PROGRESS NOTES
[facility]  Progress Note    Patient: dEa Quinn Age: 89 y.o. : 10/6/1930 MR#: [id number] SSN: [id number]  Date/Time: 2/3/2024     Subjective:   Patient seen and evaluated in the ED. No pain complaints. Very hard of hearing. Afib rates 120-150 on bedside telemetry. Patient on cardizem and heparin. Mildly hyponatremic.    Review of systems  General: No fevers or chills.  Cardiovascular: No chest pain or pressure. No palpitations.   Pulmonary: No shortness of breath, cough or wheeze.   Gastrointestinal: No abdominal pain, nausea, vomiting or diarrhea.   Genitourinary: No urinary frequency, urgency, hesitancy or dysuria.   Musculoskeletal: No joint or muscle pain, no back pain, no recent trauma.    Neurologic: No headache, numbness, tingling or weakness.   Assessment/Plan:   Syncope  New onset A-fib with RVR  Hypertension  Asymptomatic bacteriuria  Hyperlipidemia  Pneumonia  Very hard of hearing  Hyponatremia  Thyroid nodule    Admit to medical floor  Cardiac monitoring  Cardiology following  [id number] score is a 4, would hesitate to anticoagulate this patient outpatient given her fall risk and advanced age  Orthostatics  Echocardiogram pending  Cardizem infusion, wean as able  On Rocephin and azithromycin for pneumonia coverage and this is also covering a potential UTI as well  Follow cultures  Trend sodium, start patient on salt tablets  Speech eval  Follow-up thyroid ultrasound for thyroid nodule  Follow-up head CT      I spent 40 minutes with the patient in face-to-face consultation, of which greater than 50% was spent in counseling and coordination of care as described above.    Case discussed with:  [x]Patient  []Family  []Nursing  []Case Management  DVT Prophylaxis: Heparin GTT    Objective:   VS: BP (!) 111/54   Pulse 84   Temp 98.6 °F (37 °C) (Oral)   Resp 21   Ht 1.702 m (5' 7\")   Wt 58.5 kg (129 lb)   SpO2 91%   BMI 20.20 kg/m²    Tmax/24hrs: Temp  2.6 mg/dL   Troponin    Collection Time: 02/02/24  6:30 PM   Result Value Ref Range    Troponin, High Sensitivity 20 0 - 54 ng/L   Brain Natriuretic Peptide    Collection Time: 02/02/24  6:30 PM   Result Value Ref Range    NT Pro-BNP 1,314 0 - 1,800 PG/ML   D-Dimer, Quantitative    Collection Time: 02/02/24  6:30 PM   Result Value Ref Range    D-Dimer, Quant 1.83 (H) <0.46 ug/ml(FEU)   TSH    Collection Time: 02/02/24  6:30 PM   Result Value Ref Range    TSH, 3RD GENERATION 0.53 0.36 - 3.74 uIU/mL   EKG 12 Lead    Collection Time: 02/02/24  6:40 PM   Result Value Ref Range    Ventricular Rate 122 BPM    Atrial Rate 122 BPM    P-R Interval 216 ms    QRS Duration 126 ms    Q-T Interval 308 ms    QTc Calculation (Bazett) 438 ms    P Axis 112 degrees    R Axis -73 degrees    T Axis 66 degrees    Diagnosis       Sinus tachycardia with 1st degree AV block with premature atrial complexes  Right bundle branch block  Left anterior fascicular block  Bifascicular block  T wave abnormality, consider lateral ischemia  Abnormal ECG  When compared with ECG of 14-JAN-2020 15:12,  Significant changes have occurred     Urinalysis    Collection Time: 02/02/24  7:25 PM   Result Value Ref Range    Color, UA DARK YELLOW      Appearance CLOUDY      Specific Gravity, UA 1.020 1.005 - 1.030      pH, Urine 5.5 5.0 - 8.0      Protein, UA 30 (A) NEG mg/dL    Glucose, UA Negative NEG mg/dL    Ketones, Urine 15 (A) NEG mg/dL    Bilirubin Urine Negative NEG      Blood, Urine MODERATE (A) NEG      Urobilinogen, Urine 1.0 0.2 - 1.0 EU/dL    Nitrite, Urine Negative NEG      Leukocyte Esterase, Urine TRACE (A) NEG     Urinalysis, Micro    Collection Time: 02/02/24  7:25 PM   Result Value Ref Range    WBC, UA 0 to 3 0 - 4 /hpf    RBC, UA 4 to 10 0 - 5 /hpf    Epithelial Cells UA 1+ 0 - 5 /lpf    BACTERIA, URINE 4+ (A) NEG /hpf    Mucus, UA 1+ (A) NEG /lpf    Hyaline Casts, UA 0 to 3 0 - 2 /lpf   Sodium, urine, random    Collection Time: 02/02/24  7:25

## 2024-02-03 NOTE — ACP (ADVANCE CARE PLANNING)
Advance Care Planning     Advance Care Planning Inpatient Note  [facility] Department    Today's Date: 2/3/2024  Unit: [facility] EMERGENCY DEPT    Received request from .  Upon review of chart and communication with care team, patient's decision making abilities are not in question.. Patient was/were present in the room during visit.    Goals of ACP Conversation:  Discuss advance care planning documents    Health Care Decision Makers:     No healthcare decision makers have been documented.  Click here to complete HealthCare Decision Makers including selection of the Healthcare Decision Maker Relationship (ie \"Primary\")  Summary:  No Decision Maker named by patient at this time    Advance Care Planning Documents (Patient Wishes):  None     Assessment:  Patient seen in bed 20 of the emergency room where she will be admitted to the hospital due to A -Fib. Patient is very hard of hearing. There is no advance directive on file.    Interventions:  Patient DECLINED ACP conversation    Care Preferences Communicated:   No    Outcomes/Plan:      Electronically signed by Franco Kulkarni Jr., [facility] on 2/3/2024 at 9:31 AM

## 2024-02-04 ENCOUNTER — APPOINTMENT (OUTPATIENT)
Facility: HOSPITAL | Age: 89
DRG: 308 | End: 2024-02-04
Payer: MEDICARE

## 2024-02-04 LAB
ANION GAP SERPL CALC-SCNC: 8 MMOL/L (ref 3–18)
APTT PPP: 50.9 SEC (ref 23–36.4)
APTT PPP: >180 SEC (ref 23–36.4)
BASOPHILS # BLD: 0 K/UL (ref 0–0.1)
BASOPHILS NFR BLD: 0 % (ref 0–2)
BUN SERPL-MCNC: 12 MG/DL (ref 7–18)
BUN/CREAT SERPL: 20 (ref 12–20)
CALCIUM SERPL-MCNC: 8.7 MG/DL (ref 8.5–10.1)
CHLORIDE SERPL-SCNC: 100 MMOL/L (ref 100–111)
CO2 SERPL-SCNC: 26 MMOL/L (ref 21–32)
CREAT SERPL-MCNC: 0.6 MG/DL (ref 0.6–1.3)
DIFFERENTIAL METHOD BLD: ABNORMAL
ECHO AO ASC DIAM: 3.7 CM
ECHO AO ASCENDING AORTA INDEX: 2.2 CM/M2
ECHO AO ROOT DIAM: 3.1 CM
ECHO AO ROOT INDEX: 1.85 CM/M2
ECHO AV AREA PEAK VELOCITY: 2.3 CM2
ECHO AV AREA/BSA PEAK VELOCITY: 1.4 CM2/M2
ECHO AV PEAK GRADIENT: 11 MMHG
ECHO AV PEAK VELOCITY: 1.7 M/S
ECHO AV VELOCITY RATIO: 0.76
ECHO BSA: 1.66 M2
ECHO LA DIAMETER INDEX: 2.32 CM/M2
ECHO LA DIAMETER: 3.9 CM
ECHO LA TO AORTIC ROOT RATIO: 1.26
ECHO LA VOL A-L A2C: 65 ML (ref 22–52)
ECHO LA VOL A-L A4C: 47 ML (ref 22–52)
ECHO LA VOL BP: 59 ML (ref 22–52)
ECHO LA VOL MOD A2C: 63 ML (ref 22–52)
ECHO LA VOL MOD A4C: 44 ML (ref 22–52)
ECHO LA VOL/BSA BIPLANE: 35 ML/M2 (ref 16–34)
ECHO LA VOLUME AREA LENGTH: 62 ML
ECHO LA VOLUME INDEX A-L A2C: 39 ML/M2 (ref 16–34)
ECHO LA VOLUME INDEX A-L A4C: 28 ML/M2 (ref 16–34)
ECHO LA VOLUME INDEX AREA LENGTH: 37 ML/M2 (ref 16–34)
ECHO LA VOLUME INDEX MOD A2C: 38 ML/M2 (ref 16–34)
ECHO LA VOLUME INDEX MOD A4C: 26 ML/M2 (ref 16–34)
ECHO LV EDV A2C: 44 ML
ECHO LV EDV A4C: 30 ML
ECHO LV EDV BP: 38 ML (ref 56–104)
ECHO LV EDV INDEX A4C: 18 ML/M2
ECHO LV EDV INDEX BP: 23 ML/M2
ECHO LV EDV NDEX A2C: 26 ML/M2
ECHO LV EJECTION FRACTION A2C: 63 %
ECHO LV EJECTION FRACTION A4C: 70 %
ECHO LV EJECTION FRACTION BIPLANE: 67 % (ref 55–100)
ECHO LV ESV A2C: 16 ML
ECHO LV ESV A4C: 9 ML
ECHO LV ESV BP: 13 ML (ref 19–49)
ECHO LV ESV INDEX A2C: 10 ML/M2
ECHO LV ESV INDEX A4C: 5 ML/M2
ECHO LV ESV INDEX BP: 8 ML/M2
ECHO LV FRACTIONAL SHORTENING: 46 % (ref 28–44)
ECHO LV INTERNAL DIMENSION DIASTOLE INDEX: 2.74 CM/M2
ECHO LV INTERNAL DIMENSION DIASTOLIC: 4.6 CM (ref 3.9–5.3)
ECHO LV INTERNAL DIMENSION SYSTOLIC INDEX: 1.49 CM/M2
ECHO LV INTERNAL DIMENSION SYSTOLIC: 2.5 CM
ECHO LV IVSD: 0.8 CM (ref 0.6–0.9)
ECHO LV MASS 2D: 127.7 G (ref 67–162)
ECHO LV MASS INDEX 2D: 76 G/M2 (ref 43–95)
ECHO LV POSTERIOR WALL DIASTOLIC: 0.9 CM (ref 0.6–0.9)
ECHO LV RELATIVE WALL THICKNESS RATIO: 0.39
ECHO LVOT AREA: 3.1 CM2
ECHO LVOT DIAM: 2 CM
ECHO LVOT PEAK GRADIENT: 6 MMHG
ECHO LVOT PEAK VELOCITY: 1.3 M/S
ECHO PULMONARY ARTERY END DIASTOLIC PRESSURE: 5 MMHG
ECHO PV REGURGITANT MAX VELOCITY: 1.1 M/S
ECHO RA VOLUME: 35 ML
ECHO RA VOLUME: 37 ML
ECHO RV FREE WALL PEAK S': 23 CM/S
ECHO RV TAPSE: 2.7 CM (ref 1.7–?)
ECHO TV REGURGITANT MAX VELOCITY: 3.71 M/S
ECHO TV REGURGITANT PEAK GRADIENT: 55 MMHG
EOSINOPHIL # BLD: 0.1 K/UL (ref 0–0.4)
EOSINOPHIL NFR BLD: 1 % (ref 0–5)
ERYTHROCYTE [DISTWIDTH] IN BLOOD BY AUTOMATED COUNT: 12.3 % (ref 11.6–14.5)
GLUCOSE SERPL-MCNC: 98 MG/DL (ref 74–99)
HCT VFR BLD AUTO: 33.4 % (ref 35–45)
HGB BLD-MCNC: 11.9 G/DL (ref 12–16)
IMM GRANULOCYTES # BLD AUTO: 0.1 K/UL (ref 0–0.04)
IMM GRANULOCYTES NFR BLD AUTO: 1 % (ref 0–0.5)
LYMPHOCYTES # BLD: 0.8 K/UL (ref 0.9–3.6)
LYMPHOCYTES NFR BLD: 8 % (ref 21–52)
MAGNESIUM SERPL-MCNC: 2 MG/DL (ref 1.6–2.6)
MCH RBC QN AUTO: 32.6 PG (ref 24–34)
MCHC RBC AUTO-ENTMCNC: 35.6 G/DL (ref 31–37)
MCV RBC AUTO: 91.5 FL (ref 78–100)
MONOCYTES # BLD: 0.6 K/UL (ref 0.05–1.2)
MONOCYTES NFR BLD: 6 % (ref 3–10)
NEUTS SEG # BLD: 9.1 K/UL (ref 1.8–8)
NEUTS SEG NFR BLD: 85 % (ref 40–73)
NRBC # BLD: 0 K/UL (ref 0–0.01)
NRBC BLD-RTO: 0 PER 100 WBC
PLATELET # BLD AUTO: 185 K/UL (ref 135–420)
PMV BLD AUTO: 10.3 FL (ref 9.2–11.8)
POTASSIUM SERPL-SCNC: 2.8 MMOL/L (ref 3.5–5.5)
POTASSIUM SERPL-SCNC: 3.1 MMOL/L (ref 3.5–5.5)
RBC # BLD AUTO: 3.65 M/UL (ref 4.2–5.3)
SODIUM SERPL-SCNC: 134 MMOL/L (ref 136–145)
WBC # BLD AUTO: 10.7 K/UL (ref 4.6–13.2)

## 2024-02-04 PROCEDURE — 80048 BASIC METABOLIC PNL TOTAL CA: CPT

## 2024-02-04 PROCEDURE — 83735 ASSAY OF MAGNESIUM: CPT

## 2024-02-04 PROCEDURE — 2500000003 HC RX 250 WO HCPCS: Performed by: INTERNAL MEDICINE

## 2024-02-04 PROCEDURE — 2500000003 HC RX 250 WO HCPCS

## 2024-02-04 PROCEDURE — 2580000003 HC RX 258: Performed by: EMERGENCY MEDICINE

## 2024-02-04 PROCEDURE — 6360000002 HC RX W HCPCS: Performed by: INTERNAL MEDICINE

## 2024-02-04 PROCEDURE — 2580000003 HC RX 258: Performed by: INTERNAL MEDICINE

## 2024-02-04 PROCEDURE — 6360000002 HC RX W HCPCS: Performed by: EMERGENCY MEDICINE

## 2024-02-04 PROCEDURE — 2580000003 HC RX 258

## 2024-02-04 PROCEDURE — 85025 COMPLETE CBC W/AUTO DIFF WBC: CPT

## 2024-02-04 PROCEDURE — 99232 SBSQ HOSP IP/OBS MODERATE 35: CPT | Performed by: INTERNAL MEDICINE

## 2024-02-04 PROCEDURE — 76536 US EXAM OF HEAD AND NECK: CPT

## 2024-02-04 PROCEDURE — 6370000000 HC RX 637 (ALT 250 FOR IP): Performed by: INTERNAL MEDICINE

## 2024-02-04 PROCEDURE — 85730 THROMBOPLASTIN TIME PARTIAL: CPT

## 2024-02-04 PROCEDURE — 1100000003 HC PRIVATE W/ TELEMETRY

## 2024-02-04 PROCEDURE — 84132 ASSAY OF SERUM POTASSIUM: CPT

## 2024-02-04 PROCEDURE — 36415 COLL VENOUS BLD VENIPUNCTURE: CPT

## 2024-02-04 PROCEDURE — 94761 N-INVAS EAR/PLS OXIMETRY MLT: CPT

## 2024-02-04 PROCEDURE — 93306 TTE W/DOPPLER COMPLETE: CPT | Performed by: INTERNAL MEDICINE

## 2024-02-04 PROCEDURE — 71045 X-RAY EXAM CHEST 1 VIEW: CPT

## 2024-02-04 RX ORDER — DILTIAZEM HYDROCHLORIDE 90 MG/1
180 CAPSULE, EXTENDED RELEASE ORAL DAILY
Status: DISCONTINUED | OUTPATIENT
Start: 2024-02-04 | End: 2024-02-04 | Stop reason: CLARIF

## 2024-02-04 RX ORDER — POTASSIUM CHLORIDE 20 MEQ/1
40 TABLET, EXTENDED RELEASE ORAL ONCE
Status: COMPLETED | OUTPATIENT
Start: 2024-02-04 | End: 2024-02-04

## 2024-02-04 RX ORDER — DILTIAZEM HYDROCHLORIDE 180 MG/1
180 CAPSULE, COATED, EXTENDED RELEASE ORAL DAILY
Status: DISCONTINUED | OUTPATIENT
Start: 2024-02-04 | End: 2024-02-07 | Stop reason: HOSPADM

## 2024-02-04 RX ORDER — POTASSIUM CHLORIDE 7.45 MG/ML
10 INJECTION INTRAVENOUS
Status: DISCONTINUED | OUTPATIENT
Start: 2024-02-04 | End: 2024-02-04

## 2024-02-04 RX ORDER — POTASSIUM CHLORIDE 7.45 MG/ML
10 INJECTION INTRAVENOUS
Status: DISPENSED | OUTPATIENT
Start: 2024-02-04 | End: 2024-02-04

## 2024-02-04 RX ORDER — POTASSIUM CHLORIDE 7.45 MG/ML
10 INJECTION INTRAVENOUS
Status: COMPLETED | OUTPATIENT
Start: 2024-02-04 | End: 2024-02-05

## 2024-02-04 RX ADMIN — DILTIAZEM HYDROCHLORIDE 180 MG: 180 CAPSULE, COATED, EXTENDED RELEASE ORAL at 19:47

## 2024-02-04 RX ADMIN — Medication 1 G: at 19:47

## 2024-02-04 RX ADMIN — POTASSIUM CHLORIDE 10 MEQ: 7.46 INJECTION, SOLUTION INTRAVENOUS at 23:49

## 2024-02-04 RX ADMIN — Medication 1 G: at 06:24

## 2024-02-04 RX ADMIN — SODIUM CHLORIDE 10 MG/HR: 900 INJECTION, SOLUTION INTRAVENOUS at 06:12

## 2024-02-04 RX ADMIN — POTASSIUM CHLORIDE 40 MEQ: 1500 TABLET, EXTENDED RELEASE ORAL at 06:24

## 2024-02-04 RX ADMIN — POTASSIUM CHLORIDE 10 MEQ: 7.46 INJECTION, SOLUTION INTRAVENOUS at 22:58

## 2024-02-04 RX ADMIN — SODIUM CHLORIDE, PRESERVATIVE FREE 10 ML: 5 INJECTION INTRAVENOUS at 09:00

## 2024-02-04 RX ADMIN — POTASSIUM CHLORIDE 10 MEQ: 7.46 INJECTION, SOLUTION INTRAVENOUS at 06:38

## 2024-02-04 RX ADMIN — HEPARIN SODIUM 3510 UNITS: 1000 INJECTION INTRAVENOUS; SUBCUTANEOUS at 07:26

## 2024-02-04 RX ADMIN — POTASSIUM CHLORIDE 10 MEQ: 7.46 INJECTION, SOLUTION INTRAVENOUS at 09:00

## 2024-02-04 RX ADMIN — SODIUM CHLORIDE 5 MG/HR: 900 INJECTION, SOLUTION INTRAVENOUS at 10:38

## 2024-02-04 NOTE — PROGRESS NOTES
[facility] - Progress Note    Admit Date: 2/2/2024  Attending Cardiologist: Dr. Ureña    Assessment:     Patient Active Problem List    Diagnosis Date Noted    Asymptomatic bacteriuria 02/03/2024    Hyponatremia 02/03/2024    History of stroke 02/03/2024    Hearing loss 02/03/2024    Atrial fibrillation with RVR (HCC) 02/02/2024    Syncope and collapse 01/14/2020    Sinus arrhythmia 12/03/2015    RBBB 09/22/2014    Palpitations     HTN (hypertension)        - Atrial fibrillation with RVR, newly diagnosed: Rate control with diltiazem gtt. AC with heparin gtt.  - Echo 2/3/24 EF 55-60%, normal wall motion.  - Syncope: primary reason for admission  - Pneumonia: on IV Abx  - Suspected UTI: on IV abx  - Thyroid nodule: US ordered  - Chronic RBBB  - HTN  - HLD  - Palpitations  - History of pulmonary embolism s/p IVC filter in 2000  - Hard of hearing  - Hyponatremia  - Hypokalemia     Primary cardiologist: Dr. Ureña       Plan:     Addendum: Independently seen and evaluated.  Agree with below.  Patient remains in atrial fibrillation but rate controlled.  Have made adjustments to her chronotropic regimen.  Her CHADS2 Vascor is elevated.  However, she is likely quite unsteady on her feet.  She will be high risk for falls, and bleeding.  Would continue baby aspirin consistent with home medications.    - Controlled A fib rates. V-rates ~ 80s.  Continue diltiazem gtt @ 5mg/hr and start oral diltiazem at 180mg daily for rate control. Parameters to hold diltiazem gtt if systolic BP < 100mmHg. Heparin gtt for AC. ChadsVasc score 4. Patient is high risk for OAC due to frequent falls and syncopal episodes.    - Hypokalemia, K 2.8. Replacement ordered. Keep K > 3.8 and Mg > 1.8    Subjective:     No new complaints.     Objective:      Patient Vitals for the past 8 hrs:   Temp Pulse Resp BP SpO2   02/04/24 0901 97.3 °F (36.3 °C) 80 19 (!) 92/59 94 %   02/04/24 0400 98.8 °F (37.1 °C) 79 -- 119/66 91 %         Patient  Vitals for the past 96 hrs:   Weight   02/03/24 1541 58.5 kg (129 lb)   02/03/24 1515 58.5 kg (129 lb)   02/02/24 1805 58.5 kg (129 lb)       TELE: AFIB               Current Facility-Administered Medications   Medication Dose Route Frequency    potassium chloride 10 mEq/100 mL IVPB (Peripheral Line)  10 mEq IntraVENous Q1H    azithromycin (ZITHROMAX) tablet 500 mg  500 mg Oral Q24H    cefTRIAXone (ROCEPHIN) 1,000 mg in sterile water 10 mL IV syringe  1,000 mg IntraVENous Q24H    sodium chloride flush 0.9 % injection 5-40 mL  5-40 mL IntraVENous 2 times per day    sodium chloride flush 0.9 % injection 5-40 mL  5-40 mL IntraVENous PRN    0.9 % sodium chloride infusion   IntraVENous PRN    polyethylene glycol (GLYCOLAX) packet 17 g  17 g Oral Daily PRN    acetaminophen (TYLENOL) tablet 650 mg  650 mg Oral Q6H PRN    Or    acetaminophen (TYLENOL) suppository 650 mg  650 mg Rectal Q6H PRN    dilTIAZem 100 mg in sodium chloride 0.9 % 100 mL infusion (ADD-[place])  10 mg/hr IntraVENous Continuous    sodium chloride tablet 1 g  1 g Oral TID WC    ciprofloxacin-hydrocortisone (CIPRO HC OTIC) otic suspension 3 drop  3 drop Right Ear Once    sodium chloride 0.9 % bolus 1,000 mL  1,000 mL IntraVENous Once    heparin (porcine) injection 3,510 Units  60 Units/kg IntraVENous PRN    heparin (porcine) injection 1,760 Units  30 Units/kg IntraVENous PRN    heparin 25,000 units in dextrose 5% 250 mL (premix) infusion  5-30 Units/kg/hr IntraVENous Continuous         Intake/Output Summary (Last 24 hours) at 2/4/2024 1004  Last data filed at 2/4/2024 0125  Gross per 24 hour   Intake 371.17 ml   Output --   Net 371.17 ml       Physical Exam:  General:  alert, appears stated age, and cooperative, confused  Neck:  no JVD  Lungs:  clear to auscultation bilaterally  Heart:  Irregular rhythm. Normal rate.  Abdomen:  abdomen is soft without significant tenderness, masses, organomegaly or guarding  Extremities:  extremities normal, atraumatic,

## 2024-02-04 NOTE — PROGRESS NOTES
[facility]  Progress Note    Patient: Eda Quinn Age: 89 y.o. : 10/6/1930 MR#: [id number] SSN: [id number]  Date/Time: 2024     Subjective:   Send is lying in bed alert awake oriented x 2, her heart rate is under 100 A-fib.  Denies any new symptoms of new complaints.  She remembers her cardiologist name very well.  Patient is expecting discharge in 1 or 2 days    Review of systems  General: No fevers or chills.  Cardiovascular: No chest pain or pressure. No palpitations.   Pulmonary: No shortness of breath, cough or wheeze.   Gastrointestinal: No abdominal pain, nausea, vomiting or diarrhea.   Genitourinary: No urinary frequency, urgency, hesitancy or dysuria.   Musculoskeletal: No joint or muscle pain, no back pain, no recent trauma.    Neurologic: No headache, numbness, tingling or weakness.   Assessment/Plan:   Syncope  New onset A-fib with RVR  Hypertension  Asymptomatic bacteriuria  Hyperlipidemia  Pneumonia  Very hard of hearing  Hyponatremia  Thyroid nodule  History of pulmonary embolism s/p IVC filter in      Plan  -Patient currently on Cardizem IV and cardiology added p.o., eventually discontinue Cardizem.  Rate control so far appears to be acceptable  -Currently on heparin gtt. , currently patient is high risk for OAC.  Might be discharged on just aspirin.  [id number] score is a 4 - is high but patient is high risk for OAC.  -PT OT  - No Clear evidence of PNA - CT /Chest x ray negative DC antibiotics and monitor   Cardiac monitoring  advanced age  Echocardiogram - EF of 55 - 60%. Left ventricle size is normal.   Follow cultures- So far negative   Trend sodium, start patient on salt tablets- Improved sodium level   Speech eval- Regular diet with thin liquids with aspiration precaution   Follow-up thyroid ultrasound for thyroid nodule- PENDING    head CT- No evidence of acute intracranial abnormality.       I spent 40 minutes with the patient in

## 2024-02-04 NOTE — SIGNIFICANT EVENT
INTERIM UPDATE - 0606 EST on 2/04/2024    Nursing Staff calls to report that AM labs have returned with abnormal findings of K+ 2.8 mmol/L and Serum Magnesium 2.0 mg/dL.    Nursing Staff advises that Patient may not be able to drink, but would be more likely able to tolerate a pill.    Plan:  IV Potassium chloride 10 mEq q1hr x4 doses starting at 0700 EST.  PO Potassium chloride 40 mEq x1 dose.  Recheck Serum Potassium at 1300 EST.

## 2024-02-05 PROBLEM — N30.00 ACUTE CYSTITIS WITHOUT HEMATURIA: Status: ACTIVE | Noted: 2024-02-05

## 2024-02-05 PROBLEM — Z51.5 ENCOUNTER FOR PALLIATIVE CARE: Status: ACTIVE | Noted: 2024-02-05

## 2024-02-05 PROBLEM — Z71.89 GOALS OF CARE, COUNSELING/DISCUSSION: Status: ACTIVE | Noted: 2024-02-05

## 2024-02-05 PROBLEM — R54 ADVANCED AGE: Status: ACTIVE | Noted: 2024-02-05

## 2024-02-05 LAB
ANION GAP SERPL CALC-SCNC: 7 MMOL/L (ref 3–18)
APTT PPP: 50.4 SEC (ref 23–36.4)
BUN SERPL-MCNC: 15 MG/DL (ref 7–18)
BUN/CREAT SERPL: 29 (ref 12–20)
CALCIUM SERPL-MCNC: 8.8 MG/DL (ref 8.5–10.1)
CHLORIDE SERPL-SCNC: 103 MMOL/L (ref 100–111)
CO2 SERPL-SCNC: 26 MMOL/L (ref 21–32)
CREAT SERPL-MCNC: 0.51 MG/DL (ref 0.6–1.3)
ERYTHROCYTE [DISTWIDTH] IN BLOOD BY AUTOMATED COUNT: 12.6 % (ref 11.6–14.5)
GLUCOSE SERPL-MCNC: 96 MG/DL (ref 74–99)
HCT VFR BLD AUTO: 32.8 % (ref 35–45)
HGB BLD-MCNC: 10.7 G/DL (ref 12–16)
MCH RBC QN AUTO: 30.8 PG (ref 24–34)
MCHC RBC AUTO-ENTMCNC: 32.6 G/DL (ref 31–37)
MCV RBC AUTO: 94.5 FL (ref 78–100)
NRBC # BLD: 0 K/UL (ref 0–0.01)
NRBC BLD-RTO: 0 PER 100 WBC
PLATELET # BLD AUTO: 202 K/UL (ref 135–420)
PMV BLD AUTO: 10.5 FL (ref 9.2–11.8)
POTASSIUM SERPL-SCNC: 3.5 MMOL/L (ref 3.5–5.5)
RBC # BLD AUTO: 3.47 M/UL (ref 4.2–5.3)
SODIUM SERPL-SCNC: 136 MMOL/L (ref 136–145)
WBC # BLD AUTO: 7 K/UL (ref 4.6–13.2)

## 2024-02-05 PROCEDURE — 80048 BASIC METABOLIC PNL TOTAL CA: CPT

## 2024-02-05 PROCEDURE — 85730 THROMBOPLASTIN TIME PARTIAL: CPT

## 2024-02-05 PROCEDURE — 1100000003 HC PRIVATE W/ TELEMETRY

## 2024-02-05 PROCEDURE — 6360000002 HC RX W HCPCS: Performed by: INTERNAL MEDICINE

## 2024-02-05 PROCEDURE — 6370000000 HC RX 637 (ALT 250 FOR IP)

## 2024-02-05 PROCEDURE — 6370000000 HC RX 637 (ALT 250 FOR IP): Performed by: INTERNAL MEDICINE

## 2024-02-05 PROCEDURE — 94761 N-INVAS EAR/PLS OXIMETRY MLT: CPT

## 2024-02-05 PROCEDURE — 99232 SBSQ HOSP IP/OBS MODERATE 35: CPT | Performed by: INTERNAL MEDICINE

## 2024-02-05 PROCEDURE — 6360000002 HC RX W HCPCS: Performed by: EMERGENCY MEDICINE

## 2024-02-05 PROCEDURE — 85027 COMPLETE CBC AUTOMATED: CPT

## 2024-02-05 PROCEDURE — 92526 ORAL FUNCTION THERAPY: CPT

## 2024-02-05 PROCEDURE — 99223 1ST HOSP IP/OBS HIGH 75: CPT | Performed by: NURSE PRACTITIONER

## 2024-02-05 PROCEDURE — 2500000003 HC RX 250 WO HCPCS

## 2024-02-05 PROCEDURE — 2580000003 HC RX 258

## 2024-02-05 RX ORDER — ASPIRIN 81 MG/1
81 TABLET, CHEWABLE ORAL DAILY
Status: DISCONTINUED | OUTPATIENT
Start: 2024-02-05 | End: 2024-02-07 | Stop reason: HOSPADM

## 2024-02-05 RX ORDER — METOPROLOL SUCCINATE 25 MG/1
25 TABLET, EXTENDED RELEASE ORAL DAILY
Status: DISCONTINUED | OUTPATIENT
Start: 2024-02-05 | End: 2024-02-06

## 2024-02-05 RX ADMIN — ASPIRIN 81 MG 81 MG: 81 TABLET ORAL at 10:21

## 2024-02-05 RX ADMIN — Medication 1 G: at 10:20

## 2024-02-05 RX ADMIN — Medication 1 G: at 17:50

## 2024-02-05 RX ADMIN — DILTIAZEM HYDROCHLORIDE 180 MG: 180 CAPSULE, COATED, EXTENDED RELEASE ORAL at 10:20

## 2024-02-05 RX ADMIN — SODIUM CHLORIDE 5 MG/HR: 900 INJECTION, SOLUTION INTRAVENOUS at 00:51

## 2024-02-05 RX ADMIN — HEPARIN SODIUM 3510 UNITS: 1000 INJECTION INTRAVENOUS; SUBCUTANEOUS at 07:57

## 2024-02-05 RX ADMIN — METOPROLOL SUCCINATE 25 MG: 25 TABLET, EXTENDED RELEASE ORAL at 10:21

## 2024-02-05 RX ADMIN — POTASSIUM CHLORIDE 10 MEQ: 7.46 INJECTION, SOLUTION INTRAVENOUS at 00:50

## 2024-02-05 RX ADMIN — Medication 1 G: at 11:47

## 2024-02-05 NOTE — PROGRESS NOTES
[facility] - Progress Note    Admit Date: 2/2/2024  Attending Cardiologist: Dr. Ureña    Assessment:     Patient Active Problem List    Diagnosis Date Noted    Asymptomatic bacteriuria 02/03/2024    Hyponatremia 02/03/2024    History of stroke 02/03/2024    Hearing loss 02/03/2024    Atrial fibrillation with RVR (HCC) 02/02/2024    Syncope and collapse 01/14/2020    Sinus arrhythmia 12/03/2015    RBBB 09/22/2014    Palpitations     HTN (hypertension)       Atrial fibrillation with RVR, newly diagnosed: Rate control with diltiazem gtt. Aspirin for stroke prevention  - Echo 2/3/24 EF 55-60%, normal wall motion.  - Syncope: primary reason for admission  - Pneumonia: on IV Abx  - Suspected UTI: on IV abx  - Thyroid nodule: US ordered  - Chronic RBBB  - HTN  - HLD  - Palpitations  - History of pulmonary embolism s/p IVC filter in 2000  - Hard of hearing  - Hyponatremia, improved.  - Hypokalemia, 3.5 today     Primary cardiologist: Dr. Ureña       Plan:     Addendum: Independently seen and evaluated.  Agree with below.  Would continue rate control.  Her beta-blockers were restarted.  Would continue Cardizem drip today.  If able to uptitrate beta-blocker, hopefully, patient can come off of Cardizem drip.  History of anticoagulation was discussed with the patient but also with her daughter at length.  All questions were answered.  The plan is to continue aspirin for the time being.  Risk of CVA was discussed with the patient and her daughter.  5 % annual risk was quoted.  On aspirin, this will be likely be 4 % annual risk.    - Patient with controlled atrial fibrillation rates this morning. Rates were elevated throughout the day yesterday as patient lost IV access and did not receive diltiazem. She now has a PICC line. Continue IV diltiazem @ 5mg/hr and oral diltiazem at 180mg daily for rate control. Parameters to hold diltiazem gtt if systolic BP < 100mmHg. Will restart toprol xl at low dose.  - ChadsVasc  Physical Therapy Daily Progress Note    Patient: Sara Edwards   : 1978  Diagnosis/ICD-10 Code:     Diagnosis Plan   1. Right wrist pain       Referring practitioner: DELPHINE Schuler  Date of Initial Visit: Type: THERAPY  Noted: 2019  Today's Date: 2019  Patient seen for 3 sessions      PT Recheck Due: 2019  PT MD Visit: 2019       Sara Jerry      Subjective Evaluation    History of Present Illness    Subjective comment: havent really used my hands today. maybe some light housework is all. will have her MRI completed on this friday coming up. see's dr on . have noticed popping in the top of the right hand, but not really painful, just feel it. Pain  Current pain ratin             Objective   See Exercise, Manual, and Modality Logs for complete treatment.       Assessment & Plan     Assessment  Assessment details: Patient did well with all therex and has no complaints during completion. Good effort throughout treatment. No complaints with progression    Goals  Plan Goals: Short Term Goals:  1) I with HEP and have additions/changes by next re-certification. (met)    2) AROM R wrist flexion>=85°.    3) AROM R wrist extension>=60°.    4) AROM R wrist UD >= 40°.    5) AROM R wrist RD >= 25°.    6) R wrist MMT >= 4+/5.    7) R  at the #2 setting >= 40#.    8) Patient to have AROM pronation/supination for the R forearm >= 80°.    9) Patient able to make a full tight composite fist.    Long Term goals:  1) AROM for the R wrist all WNL, no increase in pain.    2) B wrist 5/5, no increase in pain.    3) R  at the #2 setting >= 60#.    4) patient able to perform 5 minutes of fine motor activities with no increase in pain.    5) patient able to perform 1 arm box carry with 15# for 3 minutes with no increase in pain.    6) patient able to perform impact and vibration activities with no increase in pain.    7) I with final HEP    8) D/C with a final HEP  and free 30 day fitness formula memebership.    Plan  Plan details: Next visit tband wrist 4 way      Progress strengthening /stabilization /functional activity            Timed:  Manual Therapy:         mins  30532;  Therapeutic Exercise:    45     mins  78876;   Aquatic Therex :        mins  08943    Neuromuscular Bill:        mins  87964;    Therapeutic Activity:          mins  87430;     Gait Training:           mins  34020;     Ultrasound:          mins  46875;    Electrical Stimulation:         mins  68887 ( );    Untimed:  Electrical Stimulation:         mins  76057 ( );  Mechanical Traction:         mins  75764;     Timed Treatment:   45   mins   Total Treatment:     60   mins  Laura Garrett PTA  Physical Therapist Assistant

## 2024-02-05 NOTE — CONSULTS
Palliative Medicine  [facility]: [phone]  [facility]: [phone]   [facility]: [phone]    Patient Name: Eda Quinn  YOB: 1930    Date of Initial Consult: 2/5/2024  Reason for Consult: goals of care   Requesting Provider: Dr Schreiber    Primary Care Physician: Roshni Archer MD      SUMMARY:   Eda Quinn is a 89 y.o. year old with a past history of a fib with RVR, IVC filter placed in 2000, [place], who was admitted on 2/2/2024 from home  with a diagnosis of UTI and pneumonia. Patient had a syncopal episode at home. She has had diarrhea and per daughter has vaso vagal episodes . Current medical issues leading to Palliative Medicine involvement include: goals of care discussions .     PALLIATIVE DIAGNOSES:   Goals of care / advanced care plan discussions   Encounter for palliative care   Uti / pneumonia   A fib   Advanced age        PLAN:   Goals of care / advanced care plan discussions Seen along with Ms Romy RN patient is resting in bed alerts to her name but is confused. Patient is not currently able to participate in her goals of care discussions. Her two daughters are at bedside ( Pamela and Jane). History is obtained from daughters.  Prior to hospitalization patient lived alone with daughters close by she was independent in her home.  She did have some standby assistance for ADLs.  Daughters tell us they believe an AMD is on file naming daughter Jane as medical power of  however they are not sure.  Patient has 3 living children the 2 daughters and 1 son who lives in [place].  Long discussion concerning her delirium ability to functionally recover from this illness.  They are hopeful with treatment and antibiotics for confusion will become less.  They do understand her hearing impairment is also a barrier to good cognition.  Goals of care discussed including benefits and burdens with advanced age.  Both daughters

## 2024-02-05 NOTE — PROGRESS NOTES
[facility] Hospitalist [facility]  Progress Note    Patient: Eda Quinn Age: 89 y.o. : 10/6/1930 MR#: [id number] SSN: [id number]  Date/Time: 2024     Subjective:   Today patient is awake but sleepy patient is accompanied by her 2 daughters in room with patient  Last p.m. patient had some behavioral issue pulled out IV lines needed midline which was ordered.      Review of systems-today Limited ROS due to patient's drowsiness  General: No fevers or chills.  Cardiovascular: No chest pain or pressure. No palpitations.   Pulmonary: No shortness of breath, cough or wheeze.   Gastrointestinal: No abdominal pain, nausea, vomiting or diarrhea.   Genitourinary: No urinary frequency, urgency, hesitancy or dysuria.   Musculoskeletal: No joint or muscle pain, no back pain, no recent trauma.    Neurologic: No headache, numbness, tingling or weakness.   Assessment/Plan:   Syncope  New onset A-fib with RVR  Hypertension  Asymptomatic bacteriuria  Hyperlipidemia  Pneumonia  Very hard of hearing  Hyponatremia  Thyroid nodule  History of pulmonary embolism s/p IVC filter in      Plan  -Very long discussion with the patient's family/2 daughters in room with patient.  Plan of management discussed with them goals of care discussed.  -Also discussed with palliative care-patient is now DNR/DNI    -Patient currently on Cardizem IV and cardiology added p.o., eventually discontinue Cardizem.  Rate control so far appears to be acceptable  -Currently on heparin gtt. , currently patient is high risk for OAC.  Might be discharged on just aspirin.  [id number] score is a 4 - is high but patient is high risk for OAC.  -PT OT  - No Clear evidence of PNA - CT /Chest x ray negative DC antibiotics and monitor   Cardiac monitoring  advanced age  Echocardiogram - EF of 55 - 60%. Left ventricle size is normal.   Follow cultures- So far negative   Trend sodium, start patient on salt tablets- Improved sodium level    Speech eval- Regular diet with thin liquids with aspiration precaution   Follow-up thyroid ultrasound for thyroid nodule- PENDING    head CT- No evidence of acute intracranial abnormality.       I spent 40 minutes with the patient in face-to-face consultation, of which greater than 50% was spent in counseling and coordination of care as described above.    Case discussed with:  [x]Patient  []Family  []Nursing  []Case Management  DVT Prophylaxis: Heparin GTT    Objective:   VS: /67   Pulse 88   Temp 98 °F (36.7 °C) (Oral)   Resp 22   Ht 1.702 m (5' 7\")   Wt 58.5 kg (129 lb)   SpO2 91%   BMI 20.20 kg/m²    Tmax/24hrs: Temp (24hrs), Av.2 °F (36.8 °C), Min:98 °F (36.7 °C), Max:98.5 °F (36.9 °C)  IOBRIEFNo intake or output data in the 24 hours ending 24 0908      General:  Alert, cooperative, no acute distress    HEENT: PERRLA, anicteric sclerae.  Very hard of hearing  Pulmonary:  CTA Bilaterally. No Wheezing/Rales.  Cardiovascular: Irregularly irregular rate and rhythm, tachycardic  GI:  Soft, Non distended, Non tender. + Bowel sounds.  Extremities: Trace edema in the lower extremities. No calf tenderness.   Psych: Good insight. Not anxious or agitated.  Neurologic: Alert and oriented X 3. Moves all ext.  Additional:    Medications:   Current Facility-Administered Medications   Medication Dose Route Frequency    dilTIAZem 100 mg in sodium chloride 0.9 % 100 mL infusion (ADD-[place])  5 mg/hr IntraVENous Continuous    dilTIAZem (CARDIZEM CD) extended release capsule 180 mg  180 mg Oral Daily    sodium chloride flush 0.9 % injection 5-40 mL  5-40 mL IntraVENous 2 times per day    sodium chloride flush 0.9 % injection 5-40 mL  5-40 mL IntraVENous PRN    0.9 % sodium chloride infusion   IntraVENous PRN    polyethylene glycol (GLYCOLAX) packet 17 g  17 g Oral Daily PRN    acetaminophen (TYLENOL) tablet 650 mg  650 mg Oral Q6H PRN    Or    acetaminophen (TYLENOL) suppository 650 mg  650 mg Rectal Q6H

## 2024-02-05 NOTE — PROGRESS NOTES
Pt lost IV access this am: has been without cardizem and heparin since then. Attempts made x4, unsuccessful.  Dr Proctor notified, order received for picc.  PICC line placed by picc team and both medications resumed at 2300.

## 2024-02-05 NOTE — ACP (ADVANCE CARE PLANNING)
Advance Care Planning     General Advance Care Planning (ACP) Conversation    Date of Conversation: 2/5/2024   Conducted with: Patient with Decision Making Capacity   Healthcare Decision Maker: Next of Kin by law (only applies in absence of above) (name) Pamela Hammonds daughter and Jane Son daughter. Patient has one other living son, Ulises Lara states she has Power of  for financial matter. She is not sure it includes Medical decision Making long. She will review the items at her mother's home and bring copies to the hospital.  In the absence of an Advance Medical Directive, health care decisions will fall to a spouse or a  consensus of the adult living children if patient is . Ms. Quinn is . According to both daughters, their brother Siddharth is aware of p[patient's hospitalization and defers decision to them.     Healthcare Decision Maker:    Primary Decision Maker: Pamela HAMMONDS - Child - [phone]    Primary Decision Maker: Jane Son - Child - [phone]  Click here to complete Healthcare Decision Makers including selection of the Healthcare Decision Maker Relationship (ie \"Primary\").  Today we documented Decision Maker(s) consistent with Legal Next of Kin hierarchy. Palliative team has asked family to provide copy of Advance Medical Directive.     Content/Action Overview:    Ms. Eda Quinn is a 89-year-old female with a history of hypertension, A-fib with RVR in ED, s/o IVC filter 2000, [place].  Patient was admitted via the ED from home with syncopal episode on the commode and after a bout of diarrhea.  Patient was also noted to have a UTI and pneumonia  Patient was seen at bedside. Her two daughters Pamela and Jane. Patient was resting and only alerted to her daughters briefly and then returned to sleep.  Daughter, Pamela was able to provide an overall picture of her mother's health. Patient was residing alone and was able to manage all of her affairs including bills.

## 2024-02-05 NOTE — PLAN OF CARE
Problem: SLP Adult - Impaired Swallowing  Goal: By Discharge: Advance to least restrictive diet without signs or symptoms of aspiration for planned discharge setting.  See evaluation for individualized goals.  Description: Patient will:  1. Tolerate PO trials with 0 s/s overt distress in 4/5 trials  2. Utilize compensatory swallow strategies/maneuvers (decrease bite/sip, size/rate, alt. liq/sol) with min cues in 4/5 trials      Rec:     Regular diet with thin liquids  Aspiration precautions  HOB >45 during po intake, remain >30 for 30-45 minutes after po   Small bites/sips; alternate liquid/solid with slow feeding rate   Oral care TID  Meds per pt preference      Outcome: Progressing   SPEECH LANGUAGE PATHOLOGY DYSPHAGIA TREATMENT    Patient: Eda Quinn (89 y.o. female)  Date: 2/5/2024  Diagnosis: Syncope and collapse [R55]  Atrial fibrillation with rapid ventricular response (HCC) [I48.91]  Atrial fibrillation with RVR (HCC) [I48.91]  Acute cystitis without hematuria [N30.00]  Acute conjunctivitis of right eye, unspecified acute conjunctivitis type [H10.31] Atrial fibrillation with RVR (HCC)      Precautions: Standard, aspiration  PLOF: As per H&P      ASSESSMENT:  Pt seen for follow up dysphagia management. Pt seen bedside with daughters present in room. Pt highly lethargic and pleasantly confused, thus daughters requesting no PO trials on this date. Able to arouse Pt seen for med pass with puree and liquid wash without any overt s/s of aspiration. Provided education to daughters regarding aspiration precautions and ensuring she is alert prior to PO intake. Daughters verbalized understanding. Recommend continuation of reg solids and thin liquids, aspiration precautions, oral care TID, and meds as tolerated.     Progression toward goals:  []         Improving appropriately and progressing toward goals  [x]         Improving slowly and progressing toward goals  []         Not making progress toward goals and  distress in bed  [x]              Call bell left within reach  [x]              Nursing notified  [x]              Family present  []              Caregiver present  []              Bed alarm activated      COMMUNICATION/EDUCATION:   [x]            Aspiration precautions; swallow safety; compensatory techniques provided via demonstration, verbalization and teach back of comprehension  []         Patient/family have participated as able in goal setting and plan of care.  []            Patient/family agree to work toward stated goals and plan of care.  []            Patient understands intent and goals of therapy, neutral about participation.  []            Patient unable to participate in goal setting/plan of care secondary to cognition, hearing/vision deficits; education ongoing with interdisciplinary staff   []            Handout regarding diet recommendations and thickener instructions provided.  []         Posted safety precautions in patient's room.    Thank you for this referral.    Ameena Ellison M.S. CCC-SLP  Speech Language Pathologist

## 2024-02-06 PROCEDURE — 2500000003 HC RX 250 WO HCPCS

## 2024-02-06 PROCEDURE — 97112 NEUROMUSCULAR REEDUCATION: CPT

## 2024-02-06 PROCEDURE — 97530 THERAPEUTIC ACTIVITIES: CPT

## 2024-02-06 PROCEDURE — 6370000000 HC RX 637 (ALT 250 FOR IP): Performed by: EMERGENCY MEDICINE

## 2024-02-06 PROCEDURE — 6370000000 HC RX 637 (ALT 250 FOR IP): Performed by: INTERNAL MEDICINE

## 2024-02-06 PROCEDURE — 97166 OT EVAL MOD COMPLEX 45 MIN: CPT

## 2024-02-06 PROCEDURE — 6370000000 HC RX 637 (ALT 250 FOR IP)

## 2024-02-06 PROCEDURE — 99232 SBSQ HOSP IP/OBS MODERATE 35: CPT | Performed by: INTERNAL MEDICINE

## 2024-02-06 PROCEDURE — 92526 ORAL FUNCTION THERAPY: CPT

## 2024-02-06 PROCEDURE — 97162 PT EVAL MOD COMPLEX 30 MIN: CPT

## 2024-02-06 PROCEDURE — 94761 N-INVAS EAR/PLS OXIMETRY MLT: CPT

## 2024-02-06 PROCEDURE — 2580000003 HC RX 258

## 2024-02-06 PROCEDURE — 1100000003 HC PRIVATE W/ TELEMETRY

## 2024-02-06 PROCEDURE — 2580000003 HC RX 258: Performed by: EMERGENCY MEDICINE

## 2024-02-06 RX ORDER — METOPROLOL SUCCINATE 50 MG/1
50 TABLET, EXTENDED RELEASE ORAL 2 TIMES DAILY
Status: DISCONTINUED | OUTPATIENT
Start: 2024-02-06 | End: 2024-02-07 | Stop reason: HOSPADM

## 2024-02-06 RX ORDER — METOPROLOL SUCCINATE 50 MG/1
50 TABLET, EXTENDED RELEASE ORAL DAILY
Status: DISCONTINUED | OUTPATIENT
Start: 2024-02-06 | End: 2024-02-06

## 2024-02-06 RX ADMIN — DILTIAZEM HYDROCHLORIDE 180 MG: 180 CAPSULE, COATED, EXTENDED RELEASE ORAL at 08:34

## 2024-02-06 RX ADMIN — Medication 1 G: at 16:06

## 2024-02-06 RX ADMIN — SODIUM CHLORIDE 5 MG/HR: 900 INJECTION, SOLUTION INTRAVENOUS at 01:35

## 2024-02-06 RX ADMIN — ASPIRIN 81 MG 81 MG: 81 TABLET ORAL at 08:34

## 2024-02-06 RX ADMIN — ACETAMINOPHEN 325MG 650 MG: 325 TABLET ORAL at 14:06

## 2024-02-06 RX ADMIN — Medication 1 G: at 11:41

## 2024-02-06 RX ADMIN — Medication 1 G: at 08:34

## 2024-02-06 RX ADMIN — METOPROLOL SUCCINATE 50 MG: 50 TABLET, EXTENDED RELEASE ORAL at 21:41

## 2024-02-06 RX ADMIN — METOPROLOL SUCCINATE 50 MG: 50 TABLET, EXTENDED RELEASE ORAL at 08:34

## 2024-02-06 RX ADMIN — SODIUM CHLORIDE, PRESERVATIVE FREE 10 ML: 5 INJECTION INTRAVENOUS at 08:34

## 2024-02-06 RX ADMIN — SODIUM CHLORIDE, PRESERVATIVE FREE 10 ML: 5 INJECTION INTRAVENOUS at 20:49

## 2024-02-06 ASSESSMENT — PAIN DESCRIPTION - DESCRIPTORS: DESCRIPTORS: ACHING

## 2024-02-06 ASSESSMENT — PAIN SCALES - GENERAL
PAINLEVEL_OUTOF10: 3
PAINLEVEL_OUTOF10: 0

## 2024-02-06 ASSESSMENT — PAIN DESCRIPTION - LOCATION: LOCATION: HEAD

## 2024-02-06 ASSESSMENT — PAIN DESCRIPTION - ORIENTATION: ORIENTATION: ANTERIOR;MID

## 2024-02-06 NOTE — CARE COORDINATION
02/06/24 1823   Service Assessment   Patient Orientation Unable to Assess   History Provided By Child/Family   Primary Caregiver Self   Support Systems Children   PCP Verified by CM Yes   Prior Functional Level Independent in ADLs/IADLs   Current Functional Level Assistance with the following:;Bathing;Dressing;Toileting;Mobility   Can patient return to prior living arrangement No  (Patient will be staying with her daughter Pamela Mattson.)   Ability to make needs known: Other (see comment)  (Unable to assess at this time.)   Family able to assist with home care needs: Yes   Financial Resources Medicare   Community Resources None   Social/Functional History   Lives With Alone   Type of Home House   Home Layout One level   Home Access Stairs to enter with rails   Entrance Stairs - Number of Steps 1   Entrance Stairs - Rails Both   Bathroom Shower/Tub Tub/Shower unit   Bathroom Toilet Standard   Bathroom Equipment Shower chair   Bathroom Accessibility Accessible   Home Equipment   (Shower Chair)   Receives Help From Family   ADL Assistance Needs assistance   Toileting Needs assistance   Homemaking Assistance Needs assistance   Ambulation Assistance Needs assistance   Transfer Assistance Needs assistance   Active  No   Patient's  Info Patient's family transports patient.   Occupation Retired   Discharge Planning   Type of Residence House   Living Arrangements Alone   Current Services Prior To Admission Durable Medical Equipment   Current DME Prior to Arrival Shower Chair   Potential Assistance Needed Durable Medical Equipment;Home Care   Potential DME Needed Bedside Commode;Walker   DME Ordered? No   Potential Assistance Purchasing Medications No   Type of Home Care Services PT;OT;Nursing Services;Skilled Therapy   Patient expects to be discharged to: House  (with Home Health Services, and Family Assistance)   Services At/After Discharge   Transition of Care Consult (CM Consult) Home Health   Internal Home

## 2024-02-06 NOTE — PLAN OF CARE
Problem: SLP Adult - Impaired Swallowing  Goal: By Discharge: Advance to least restrictive diet without signs or symptoms of aspiration for planned discharge setting.  See evaluation for individualized goals.  Description: Patient will:  1. Tolerate PO trials with 0 s/s overt distress in 4/5 trials  2. Utilize compensatory swallow strategies/maneuvers (decrease bite/sip, size/rate, alt. liq/sol) with min cues in 4/5 trials      Rec:     Regular diet with thin liquids  Aspiration precautions  HOB >45 during po intake, remain >30 for 30-45 minutes after po   Small bites/sips; alternate liquid/solid with slow feeding rate   Oral care TID  Meds per pt preference      Outcome: Progressing  SPEECH LANGUAGE PATHOLOGY DYSPHAGIA TREATMENT    Patient: Eda Quinn (89 y.o. female)  Date: 2/6/2024  Diagnosis: Syncope and collapse [R55]  Atrial fibrillation with rapid ventricular response (HCC) [I48.91]  Atrial fibrillation with RVR (HCC) [I48.91]  Acute cystitis without hematuria [N30.00]  Acute conjunctivitis of right eye, unspecified acute conjunctivitis type [H10.31] Atrial fibrillation with RVR (HCC)      Precautions: Standard, aspiration  PLOF: As per H&P      ASSESSMENT:  Pt seen for follow up dysphagia management. Pt seen bedside, alert, and sitting upright in bed with daughters bedside upon SLP arrival. Pt seen with reg solids and thin liquids + straw consecutive swallows without any overt s/s of aspiration. Pt's daughters requesting soft and bite sized solids for ease of mastication and self feeding. Recommend soft and bite sized solids and thin liquids, aspiration precautions, oral care TID, and meds as tolerated. Will follow 1-2 times to monitor diet management.     Progression toward goals:  [x]         Improving appropriately and progressing toward goals  []         Improving slowly and progressing toward goals  []         Not making progress toward goals and plan of care will be adjusted

## 2024-02-06 NOTE — PROGRESS NOTES
[facility] - Progress Note    Admit Date: 2/2/2024  Attending Cardiologist: Dr. Erickson    Assessment:     Patient Active Problem List    Diagnosis Date Noted    Goals of care, counseling/discussion 02/05/2024    Encounter for palliative care 02/05/2024    Acute cystitis without hematuria 02/05/2024    Advanced age 02/05/2024    Asymptomatic bacteriuria 02/03/2024    Hyponatremia 02/03/2024    History of stroke 02/03/2024    Hearing loss 02/03/2024    Atrial fibrillation with RVR (HCC) 02/02/2024    Syncope and collapse 01/14/2020    Sinus arrhythmia 12/03/2015    RBBB 09/22/2014    Palpitations     HTN (hypertension)      - Atrial fibrillation with RVR, newly diagnosed: Aspirin for stroke prevention. Rate control with diltiazem and toprol XL.   - Echo 2/3/24 EF 55-60%, normal wall motion.  - Syncope: primary reason for admission  - Thyroid nodule: unable to complete US  - Chronic RBBB  - HTN  - HLD  - Palpitations  - History of pulmonary embolism s/p IVC filter in 2000  - Hard of hearing  - Hyponatremia, improved.     Primary cardiologist: Dr. Ureña       Plan:     - Telemetry reviewed, patient remains in rate controlled atrial fibrillation, ventricular rates 90s-105. Discontinue diltiazem infusion. Continue PO diltiazem. Metoprolol XL increased to 50 mg BID today.   - Continue aspirin for stroke prevention. Not a good candidate for OAC as patient is a high risk for fall.    Subjective:     No new complaints.     Objective:      Patient Vitals for the past 8 hrs:   Temp Pulse Resp BP SpO2   02/06/24 0729 97.6 °F (36.4 °C) 91 16 110/69 94 %   02/06/24 0500 -- 84 -- -- --         Patient Vitals for the past 96 hrs:   Weight   02/03/24 1541 58.5 kg (129 lb)   02/03/24 1515 58.5 kg (129 lb)   02/02/24 1805 58.5 kg (129 lb)       TELE: AFIB               Current Facility-Administered Medications   Medication Dose Route Frequency    metoprolol succinate (TOPROL XL) extended release tablet 50 mg  50 mg

## 2024-02-06 NOTE — CARE COORDINATION
CM spoke with patient's daughter Pamela Mattson at bedside.   Discharge plan is patient to come stay with patient's daughter Pamela Mattson.  Patient's daughter's address is:  [address] [place] [id number]    Patient's daughter refused SNF for patient.   Patient's daughter gave FOC verbal consent for [facility].   Patient's daughter requesting DME Rolling Walker and Bedside Commode.   Patient will need Medical Transport to patient's daughter's home at time of discharge.       JOSE Perfect Served Dr Proctor, and updated that CM will need Home Health orders, and DME orders for Rolling Walker, and Bedside Commode when possible.             Carlene Sanders, RN  Case Management [phone]

## 2024-02-06 NOTE — PLAN OF CARE
Problem: Physical Therapy - Adult  Goal: By Discharge: Performs mobility at highest level of function for planned discharge setting.  See evaluation for individualized goals.  Description: Physical Therapy Goals  Initiated 2/6/2024 and to be accomplished within 7 day(s)  1.  Patient will move from supine to sit and sit to supine , scoot up and down, and roll side to side in bed with modified independence.    2.  Patient will transfer from bed to chair and chair to bed with minimal assistance/contact guard assist using the least restrictive device.  3.  Patient will perform sit to stand with modified independence.  4.  Patient will ambulate with minimal assistance/contact guard assist for 25 feet with the least restrictive device.     PLOF: Ind. Lives alone in a single story home, [address]   Outcome: Progressing   PHYSICAL THERAPY EVALUATION    Patient: Eda Quinn (89 y.o. female)  Date: 2/6/2024  Primary Diagnosis: Syncope and collapse [R55]  Atrial fibrillation with rapid ventricular response (HCC) [I48.91]  Atrial fibrillation with RVR (HCC) [I48.91]  Acute cystitis without hematuria [N30.00]  Acute conjunctivitis of right eye, unspecified acute conjunctivitis type [H10.31]       Precautions: Aspiration Risk, General Precautions, Fall Risk,  ,  ,  ,  ,  ,  ,    ASSESSMENT :  Received lying with 2 daughters at bedside. Daughters state that patient is deaf and communicate vis lip reading and writing on paper. Subjective info provided by daughters. Vitals monitored throughout session (see below). Gross BLE AROM / strength assessed in bed and deemed decreased but functional. Supine > sit transfer with HOB elevated required manual / tactile cues for UE support and min assist to trunk to complete task. Notes dizziness upon sitting that improves after ~ 2 mins. Noted to be incontinent of urine. Sit > stand with mod assist endorsing increased dizziness and fatigue. Maintains standing ~ 30 seconds while nikos care  functional    Range Of Motion (BLE):  AROM: Generally decreased, functional  PROM: Generally decreased, functional    Functional Mobility:  Bed Mobility:    Bed Mobility Training  Bed Mobility Training: Yes  Rolling: Minimum assistance  Supine to Sit: Minimum assistance  Sit to Supine: Mod assistance  Scooting: total assistance  Transfers:    Transfer Training  Transfer Training: Yes  Sit to Stand: Mod assistance  Stand to Sit: Mod assistance    Balance  Sitting: Intact  Standing: Impaired  Standing - Static: Fair      Pain:  Pain level pre-treatment: 0/10   Pain level post-treatment: 0/10     Activity Tolerance:   Activity Tolerance: Patient limited by endurance;Patient tolerated evaluation without incident    After treatment:   []         Patient left in no apparent distress sitting up in chair  [x]         Patient left in no apparent distress in bed  [x]         Call bell left within reach  [x]         Nursing notified  []         Caregiver present  []         Bed alarm activated  []         Chair alarm activated  []         SCDs applied    COMMUNICATION/EDUCATION:   Patient Education  Education Given To: Patient;Family  Education Provided: Role of Therapy;Plan of Care;Family Education  Education Method: Demonstration;Verbal;Teach Back  Barriers to Learning: Hearing  Education Outcome: Demonstrated understanding;Continued education needed    Thank you for this referral.  Brett Garay, SPT  Minutes: 33    Eval Complexity: Decision Making: Medium Complexity

## 2024-02-06 NOTE — PLAN OF CARE
Problem: Occupational Therapy - Adult  Goal: By Discharge: Performs self-care activities at highest level of function for planned discharge setting.  See evaluation for individualized goals.  Description: Occupational Therapy Goals:  Initiated 2/6/2024 to be met within 7-10 days.    1.  Patient will perform upper body dressing with supervision/set-up.   2.  Patient will perform upper body bathing with supervision/set-up.  3.  Patient will perform lower body dressing with supervision/set-up.  4.  Patient will perform all aspects of toileting with supervision/set-up.  5.  Patient will perform grooming at the EOB with modified independence.  6.  Patient will participate in upper extremity therapeutic exercise/activities with modified independence for 8-10 minutes to increase strength/endurance for ADLs.    7.  Patient will utilize energy conservation techniques during functional activities with verbal, visual, and tactile cues.    PLOF: Independent with ADL's      OCCUPATIONAL THERAPY EVALUATION    Patient: Eda Quinn (89 y.o. female)  Date: 2/6/2024  Primary Diagnosis: Syncope and collapse [R55]  Atrial fibrillation with rapid ventricular response (HCC) [I48.91]  Atrial fibrillation with RVR (HCC) [I48.91]  Acute cystitis without hematuria [N30.00]  Acute conjunctivitis of right eye, unspecified acute conjunctivitis type [H10.31]    Precautions: Aspiration Risk, General Precautions, Fall Risk  ASSESSMENT :    Patient cleared to participate by RN for OT evaluation. Pt supine in bed, a ox4,  and agreeable to proceed with session, Daughters present. Pt hard of hearing but able to read words off paper for commands t/o session independently. BP assessed 117/68 prior to session. Pt performed supine to sitting at the EOB with contact guard assistance. Pt raised BUE above head and behind her back to indicate performing UB dressing CGA d/t dizziness. Pt repositioned immediately to supine with minimum assistance. Pt able  setting.  Based on an AM-PAC score and their current ADL deficits; it is recommended that the patient have 2-3 sessions per week of Occupational Therapy at d/c to increase the patient's independence.      This [facility] score should be considered in conjunction with interdisciplinary team recommendations to determine the most appropriate discharge setting. Patient's social support, diagnosis, medical stability, and prior level of function should also be taken into consideration.     SUBJECTIVE:   Patient stated, “I am at [facility].” Pt answered when ask where she was    OBJECTIVE DATA SUMMARY:     Past Medical History:   Diagnosis Date    Atrial arrhythmia     palpitation    Cancer (HCC)     cervical    Echocardiogram abnormal 10/01/2014    EF 60%.  No WMA.  Mild LVH.  Indeterminate diastolic fx.  RVSP 40 mmHg.  Mild LAE.  Mild MR.  Mild-mod TR.      Holter monitor, abnormal 10/01/2014    24-hr Holter.  Sinus rhythm w/change in conduction, avg HR 65 bpm (range 50-96 bpm).  Single SVEs - 2590.  Four runs of SVT, max 4 beats.    HTN (hypertension)     Joint pain     Palpitations     Pulmonary embolism (HCC) 2002    Vaso vagal episode     Wears eyeglasses    No past surgical history on file.    Home Situation:   Social/Functional History  Lives With: Alone  Type of Home: House  Home Layout: One level  Home Access: Stairs to enter with rails  Entrance Stairs - Number of Steps: 1  Entrance Stairs - Rails: Both  Bathroom Shower/Tub: Tub/Shower unit  Bathroom Equipment: Shower chair  [x]  Right hand dominant   []  Left hand dominant    Cognitive/Behavioral Status:  Orientation  Overall Orientation Status: Within Functional Limits  Orientation Level: Oriented to person;Oriented to place  Cognition  Overall Cognitive Status: WFL  Arousal/Alertness: Appropriate responses to stimuli  Following Commands: Follows all commands without difficulty  Attention Span: Attends with cues to redirect  Memory: Appears intact    Skin:

## 2024-02-06 NOTE — PROGRESS NOTES
[facility] Hospitalist Group  Progress Note    Patient: Eda Quinn Age: 89 y.o. : 10/6/1930 MR#: [id number] SSN: [id number]  Date/Time: 2024     Subjective:     Patient lying in bed, not in any distress no shortness of breath no chest pain no palpitation patient is accompanied by 2 of her daughters in the room.      Review of systems-today Limited ROS due to patient's drowsiness  General: No fevers or chills.  Cardiovascular: No chest pain or pressure. No palpitations.   Pulmonary: No shortness of breath, cough or wheeze.   Gastrointestinal: No abdominal pain, nausea, vomiting or diarrhea.   Genitourinary: No urinary frequency, urgency, hesitancy or dysuria.   Musculoskeletal: No joint or muscle pain, no back pain, no recent trauma.    Neurologic: No headache, numbness, tingling or weakness.   Assessment/Plan:   Syncope  New onset A-fib with RVR  Hypertension  Asymptomatic bacteriuria  Hyperlipidemia  Pneumonia  Very hard of hearing  Hyponatremia  Thyroid nodule  History of pulmonary embolism s/p IVC filter in      Plan  -Discontinue Cardizem IV, continue Cardizem p.o. and metoprolol for rate control  -DC heparin, start aspirin  -Patient is not a good candidate for long-term anticoagulation.   I discussed this issue with patient's daughter who understood it very well they understand that risk of stroke increases in presence of atrial fibrillation and not on long-term anticoagulation however risk involved will obtain determination in this patient is more than benefit.      I spent 40 minutes with the patient in face-to-face consultation, of which greater than 50% was spent in counseling and coordination of care as described above.    Case discussed with:  [x]Patient  [x]Family  []Nursing  []Case Management  DVT Prophylaxis: Heparin GTT    Objective:   VS: /76   Pulse 89   Temp 98.3 °F (36.8 °C) (Oral)   Resp 22   Ht 1.702 m (5' 7\")   Wt 58.5 kg (129 lb)   SpO2 90%   department.

## 2024-02-07 ENCOUNTER — HOME HEALTH ADMISSION (OUTPATIENT)
Age: 89
End: 2024-02-07
Payer: MEDICARE

## 2024-02-07 VITALS
HEART RATE: 84 BPM | RESPIRATION RATE: 19 BRPM | SYSTOLIC BLOOD PRESSURE: 127 MMHG | WEIGHT: 129 LBS | OXYGEN SATURATION: 96 % | BODY MASS INDEX: 20.25 KG/M2 | TEMPERATURE: 97.8 F | HEIGHT: 67 IN | DIASTOLIC BLOOD PRESSURE: 64 MMHG

## 2024-02-07 PROCEDURE — 2580000003 HC RX 258: Performed by: EMERGENCY MEDICINE

## 2024-02-07 PROCEDURE — 94761 N-INVAS EAR/PLS OXIMETRY MLT: CPT

## 2024-02-07 PROCEDURE — 99232 SBSQ HOSP IP/OBS MODERATE 35: CPT | Performed by: NURSE PRACTITIONER

## 2024-02-07 PROCEDURE — 99232 SBSQ HOSP IP/OBS MODERATE 35: CPT | Performed by: INTERNAL MEDICINE

## 2024-02-07 PROCEDURE — 6370000000 HC RX 637 (ALT 250 FOR IP)

## 2024-02-07 PROCEDURE — 6370000000 HC RX 637 (ALT 250 FOR IP): Performed by: INTERNAL MEDICINE

## 2024-02-07 PROCEDURE — 97110 THERAPEUTIC EXERCISES: CPT

## 2024-02-07 PROCEDURE — 97116 GAIT TRAINING THERAPY: CPT

## 2024-02-07 PROCEDURE — 92526 ORAL FUNCTION THERAPY: CPT

## 2024-02-07 RX ORDER — ERYTHROMYCIN 5 MG/G
OINTMENT OPHTHALMIC
Qty: 1 EACH | Refills: 0 | Status: SHIPPED | OUTPATIENT
Start: 2024-02-08

## 2024-02-07 RX ORDER — DILTIAZEM HYDROCHLORIDE 180 MG/1
180 CAPSULE, COATED, EXTENDED RELEASE ORAL DAILY
Qty: 30 CAPSULE | Refills: 3 | Status: SHIPPED | OUTPATIENT
Start: 2024-02-08

## 2024-02-07 RX ORDER — ERYTHROMYCIN 5 MG/G
OINTMENT OPHTHALMIC NIGHTLY
Status: DISCONTINUED | OUTPATIENT
Start: 2024-02-07 | End: 2024-02-07 | Stop reason: HOSPADM

## 2024-02-07 RX ORDER — BISACODYL 10 MG
10 SUPPOSITORY, RECTAL RECTAL DAILY
Qty: 30 SUPPOSITORY | Refills: 0 | Status: SHIPPED | OUTPATIENT
Start: 2024-02-07 | End: 2024-03-08

## 2024-02-07 RX ORDER — POLYETHYLENE GLYCOL 3350 17 G/17G
17 POWDER, FOR SOLUTION ORAL DAILY PRN
Qty: 30 PACKET | Refills: 0 | Status: SHIPPED | OUTPATIENT
Start: 2024-02-07 | End: 2024-03-08

## 2024-02-07 RX ADMIN — Medication 1 G: at 07:48

## 2024-02-07 RX ADMIN — ERYTHROMYCIN: 5 OINTMENT OPHTHALMIC at 12:13

## 2024-02-07 RX ADMIN — METOPROLOL SUCCINATE 50 MG: 50 TABLET, EXTENDED RELEASE ORAL at 07:49

## 2024-02-07 RX ADMIN — DILTIAZEM HYDROCHLORIDE 180 MG: 180 CAPSULE, COATED, EXTENDED RELEASE ORAL at 07:49

## 2024-02-07 RX ADMIN — Medication 1 G: at 12:13

## 2024-02-07 RX ADMIN — Medication 1 G: at 17:31

## 2024-02-07 RX ADMIN — ASPIRIN 81 MG 81 MG: 81 TABLET ORAL at 07:48

## 2024-02-07 RX ADMIN — SODIUM CHLORIDE, PRESERVATIVE FREE 10 ML: 5 INJECTION INTRAVENOUS at 07:50

## 2024-02-07 NOTE — CARE COORDINATION
Discharge order noted for today. Pt has been accepted to [facility]. Met with patient and daughter Pamela Mattson. and are agreeable to the transition plan today. [facility] Patient's discharge summary and home health  orders have been forwarded to [facility] health  agency   Updated bedside RN,  to the transition plan.    Discharge information has been documented on the AVS.       Yari Guerrier  Case management

## 2024-02-07 NOTE — PROGRESS NOTES
age  Resolved Problems:    * No resolved hospital problems. *    Past Medical History:   Diagnosis Date    Atrial arrhythmia     palpitation    Cancer (HCC)     cervical    Echocardiogram abnormal 10/01/2014    EF 60%.  No WMA.  Mild LVH.  Indeterminate diastolic fx.  RVSP 40 mmHg.  Mild LAE.  Mild MR.  Mild-mod TR.      Holter monitor, abnormal 10/01/2014    24-hr Holter.  Sinus rhythm w/change in conduction, avg HR 65 bpm (range 50-96 bpm).  Single SVEs - 2590.  Four runs of SVT, max 4 beats.    HTN (hypertension)     Joint pain     Palpitations     Pulmonary embolism (HCC) 2002    Vaso vagal episode     Wears eyeglasses       No past surgical history on file.   No family history on file.  History reviewed, no pertinent family history.  Social History     Tobacco Use    Smoking status: Never    Smokeless tobacco: Never   Substance Use Topics    Alcohol use: No     Allergies   Allergen Reactions    [place] Hives      Current Facility-Administered Medications   Medication Dose Route Frequency    erythromycin (ROMYCIN) ophthalmic ointment   Right Eye Nightly    metoprolol succinate (TOPROL XL) extended release tablet 50 mg  50 mg Oral BID    aspirin chewable tablet 81 mg  81 mg Oral Daily    dilTIAZem (CARDIZEM CD) extended release capsule 180 mg  180 mg Oral Daily    sodium chloride flush 0.9 % injection 5-40 mL  5-40 mL IntraVENous 2 times per day    sodium chloride flush 0.9 % injection 5-40 mL  5-40 mL IntraVENous PRN    0.9 % sodium chloride infusion   IntraVENous PRN    polyethylene glycol (GLYCOLAX) packet 17 g  17 g Oral Daily PRN    acetaminophen (TYLENOL) tablet 650 mg  650 mg Oral Q6H PRN    Or    acetaminophen (TYLENOL) suppository 650 mg  650 mg Rectal Q6H PRN    sodium chloride tablet 1 g  1 g Oral TID WC    ciprofloxacin-hydrocortisone (CIPRO HC OTIC) otic suspension 3 drop  3 drop Right Ear Once    sodium chloride 0.9 % bolus 1,000 mL  1,000 mL IntraVENous Once        LAB AND IMAGING FINDINGS:      Lab Results   Component Value Date/Time    WBC 7.0 02/05/2024 05:00 AM    HGB 10.7 02/05/2024 05:00 AM     02/05/2024 05:00 AM     Lab Results   Component Value Date/Time     02/05/2024 05:00 AM    K 3.5 02/05/2024 05:00 AM     02/05/2024 05:00 AM    CO2 26 02/05/2024 05:00 AM    BUN 15 02/05/2024 05:00 AM    MG 2.0 02/04/2024 03:12 AM      Lab Results   Component Value Date/Time    GLOB 3.1 04/26/2023 12:59 PM     Lab Results   Component Value Date/Time    APTT 50.4 02/05/2024 05:00 AM      No results found for: \"IRON\", \"TIBC\", \"IBCT\", \"FERR\"   No results found for: \"PH\", \"PCO2\", \"PO2\"  No components found for: \"GLPOC\"   Lab Results   Component Value Date/Time    CKMB 6.7 01/14/2020 02:04 PM    TROPONINI <0.02 01/14/2020 02:04 PM              Total time: 35 minutes

## 2024-02-07 NOTE — PROGRESS NOTES
[facility] - Progress Note    Admit Date: 2/2/2024  Attending Cardiologist: Dr. Erickson    Assessment:     Patient Active Problem List    Diagnosis Date Noted    Goals of care, counseling/discussion 02/05/2024    Encounter for palliative care 02/05/2024    Acute cystitis without hematuria 02/05/2024    Advanced age 02/05/2024    Asymptomatic bacteriuria 02/03/2024    Hyponatremia 02/03/2024    History of stroke 02/03/2024    Hearing loss 02/03/2024    Atrial fibrillation with RVR (HCC) 02/02/2024    Syncope and collapse 01/14/2020    Sinus arrhythmia 12/03/2015    RBBB 09/22/2014    Palpitations     HTN (hypertension)      - Atrial fibrillation with RVR, newly diagnosed: Aspirin for stroke prevention. Rate control with diltiazem and toprol XL.   - Echo 2/3/24 EF 55-60%, normal wall motion.  - Syncope: primary reason for admission  - Thyroid nodule: unable to complete US  - Chronic RBBB  - HTN  - HLD  - Palpitations  - History of pulmonary embolism s/p IVC filter in 2000  - Hard of hearing  - Hyponatremia, improved.     Primary cardiologist: Dr. Ureña    Plan:     - Telemetry reviewed, patient remains in rate controlled atrial fibrillation, ventricular rates 70-80s. Continue PO diltiazem and Metoprolol XL 50 mg BID.  - Continue aspirin for stroke prevention. Not a good candidate for OAC as patient is a high risk for fall.  - Patient is stable from cardiology standpoint for discharge. Will schedule outpatient follow up in four weeks.     Subjective:     No new complaints.     Objective:      Patient Vitals for the past 8 hrs:   Temp Pulse Resp BP SpO2   02/07/24 0735 99 °F (37.2 °C) 82 18 137/80 94 %   02/07/24 0659 -- 70 -- -- --   02/07/24 0430 97.7 °F (36.5 °C) 85 18 131/66 93 %         Patient Vitals for the past 96 hrs:   Weight   02/03/24 1541 58.5 kg (129 lb)   02/03/24 1515 58.5 kg (129 lb)       TELE: AFIB               Current Facility-Administered Medications   Medication Dose Route

## 2024-02-07 NOTE — PROGRESS NOTES
As part of the discharge instructions, medications already given today were discussed with the patient. The next dose due of all ordered meds was highlighted as part of the medication discharge instructions. Discussed with the patient the importance of taking medications as directed, as well as the side effects and adverse reactions to medications ordered.

## 2024-02-07 NOTE — CARE COORDINATION
1031 Sent home health referral to [facility].  [facility] received.

## 2024-02-07 NOTE — CARE COORDINATION
CM spoke with patient's daughter Pamela Mattson and patient, updated with discharge order for today.   CM confirmed daughter's home address again for Medical Transport home.   CM updated patient's daughter that [employer] will be delivering Hospital Bed and Wheelchair to patient's home, and will call for delivery, and that [facility] will be taking care of patient for Home Health services.   Patient's daughter is agreeable to the discharge plan for today.       CM faxed PCS form and facesheet to [facility].               Carlene Sanders RN  Case Management [phone]

## 2024-02-07 NOTE — PLAN OF CARE
Problem: Physical Therapy - Adult  Goal: By Discharge: Performs mobility at highest level of function for planned discharge setting.  See evaluation for individualized goals.  Description: Physical Therapy Goals  Initiated 2/6/2024 and to be accomplished within 7 day(s)  1.  Patient will move from supine to sit and sit to supine , scoot up and down, and roll side to side in bed with modified independence.    2.  Patient will transfer from bed to chair and chair to bed with minimal assistance/contact guard assist using the least restrictive device.  3.  Patient will perform sit to stand with modified independence.  4.  Patient will ambulate with minimal assistance/contact guard assist for 25 feet with the least restrictive device.     PLOF: Ind. Lives alone in a single story home, 1 [address]   Outcome: Progressing     PHYSICAL THERAPY TREATMENT    Patient: Eda Quinn (89 y.o. female)  Date: 2/7/2024  Diagnosis: Syncope and collapse [R55]  Atrial fibrillation with rapid ventricular response (HCC) [I48.91]  Atrial fibrillation with RVR (HCC) [I48.91]  Acute cystitis without hematuria [N30.00]  Acute conjunctivitis of right eye, unspecified acute conjunctivitis type [H10.31] Atrial fibrillation with RVR (HCC)      Precautions: Aspiration Risk, General Precautions, Fall Risk,  ,  ,  ,  ,  ,  ,      ASSESSMENT:  Pt received in bed in NAD with 2 daughters at bedside. Pt Unga so pen and paper and demonstration was used to communicate with pt. Sup to sit with HOB elevated with min A and additional time with TC for her to scoot to EOB with CGA. Pt completes TE listed below with initial demonstration of tasks while demonstrating good seated dynamic balance. Sit to stand into RW with CGA and ambulates within the room with min A with 1 lateral LOB with mod A for correction. Pt ambulates to recliner and pivots with min a, manual cues for UE reach back and CGA for stand to sit. Written instructions for scooting her buttocks back

## 2024-02-07 NOTE — DISCHARGE SUMMARY
CM messaged dr Proctor via [employer]. Pt still needs an order for rolling walker and bedside commode.         Yari Guerrier  Case Management

## 2024-02-07 NOTE — PLAN OF CARE
better”    OBJECTIVE:   Daily Assessment:  Baseline Assessment  Respiratory Status: Room air  O2 Device: None (Room air)  Communication Observation: Functional  Follows Directions: Simple  Current Diet : Soft and bite sized  Current Liquid Diet : Thin  Dentition: Adequate  Patient Positioning: Upright in bed  Baseline Vocal Quality: Normal  Volitional Cough: Strong    Orientation:  Person    Dysphagia Treatment:  Oral Assessment:  Oral Motor   Labial: No impairment  Dentition: Intact  Oral Hygiene: Moist, Clean  Lingual: No impairment  Mandible: No impairment        P.O. Trials:   PO Trials  Assessment Method(s): Observation, Palpation  Vocal Quality: No Impairment  Consistency Presented: Regular, Pureed, Thin  How Presented: Self-fed/presented, Straw  Bolus Acceptance: No impairment  Bolus Formation/Control: No impairment  Propulsion: No impairment  Oral Residue: 10-50% of bolus  Initiation of Swallow: No impairment  Laryngeal Elevation: Functional  Aspiration Signs/Symptoms: None  Pharyngeal Phase Characteristics: No impairment, issues, or problems  Effective Modifications: Alternate liquids/solids  Cues for Modifications: Minimal          PAIN:  Start of Tx: 0  End of Tx: 0     After treatment:   []              Patient left in no apparent distress sitting up in chair  [x]              Patient left in no apparent distress in bed  [x]              Call bell left within reach  []              Nursing notified  [x]              Caregiver present  []              Bed alarm activated      COMMUNICATION/EDUCATION:   [x]            Aspiration precautions; swallow safety; compensatory techniques provided via demonstration, verbalization and teach back of comprehension  []         Patient/family have participated as able in goal setting and plan of care.  []            Patient/family agree to work toward stated goals and plan of care.  []            Patient understands intent and goals of therapy, neutral about

## 2024-02-07 NOTE — PLAN OF CARE
Problem: Discharge Planning  Goal: Discharge to home or other facility with appropriate resources  Outcome: Progressing  Flowsheets  Taken 2/6/2024 2000 by Jewell Avendaño RN  Discharge to home or other facility with appropriate resources: Identify barriers to discharge with patient and caregiver  Taken 2/6/2024 0725 by Erika Gramajo RN  Discharge to home or other facility with appropriate resources:   Identify barriers to discharge with patient and caregiver   Arrange for needed discharge resources and transportation as appropriate   Identify discharge learning needs (meds, wound care, etc)     Problem: Safety - Adult  Goal: Free from fall injury  Outcome: Progressing

## 2024-02-07 NOTE — HOME CARE
Received home health referral for [facility] for the following disciplines  (SN / PT / OT).  Discharge order noted for today.  Spoke with patient and both daughters in room.    Explained home health care services and routines.  Answered questions to best of my knowledge.   Demographics verified including insurance, phone and address confirmed.  Patient has the following DME: the following have been ordered to [employer] per CM:  hospital bed; wheelchair and rolling walker.  Caregivers available to reside in dtr's home  -  Ms Mattson will be primary and other family also available to assist.  Orders noted and arranged and sent to central intake and scheduling.        ---   MARISA Vega [facility] Home Care Liaison

## 2024-02-07 NOTE — CARE COORDINATION
Spoke to pt daughter Pamela, she stated she prefers the wheelchair over the walker, and she doesn't need the bedside commode.     Cm to order hospital bed and wheelchair via [facility] for patient.     1600: Ordered hosp bed and wheelchair for patient.     Yari Guerrier  Case Management.

## 2024-02-07 NOTE — PALLIATIVE CARE
Palliative Medicine     Palliative Medicine Team members for follow up visit. Patient was sitting up in bed and her daughter, Pamela was assisting with her meal. Patient was alert and engaging in conversation despite her hearing loss. She shared how wonderful the care and staff were to her. She is looking forward to being discharged \" I just want to be home\". Palliative team spoke with daughter Pamela and Jane about next steps. They want to have patient at home with the needed DME and HH services to include physical therapy.  They want to continue on the course of rehab and not comfort.     Daughter have completed a POLST form during this admission for DNR/DNI and NO feeding Tubes.     CODE STATUS - DNR/DNI     Yusra FRANCON, RN, CHPN  Palliative Medicine Inpatient RN  Palliative COPE Line: [phone]

## 2024-02-07 NOTE — CARE COORDINATION
CM called [facility] [phone] spoke with Nicol, [facility] transport scheduled for 9:30 pm tonight for discharge home today.         CM spoke with Ruddy MARIN, and updated.         CM spoke with patient's daughter said Transport time changed to 30 minutes.       CM confirmed with [facility] Coordinator that [facility] called back, and will be here in 30 minutes to transport patient home today for discharge.       [facility] here to transport patient.   CM gave PCS form, 2 facesheets, and copy of DNR to [facility].             Carlene Sanders, RN  Case Management [phone]

## 2024-02-07 NOTE — DISCHARGE SUMMARY
[facility] Hospitalist [facility]    Discharge Summary    Patient: Eda Quinn MRN: [id number]  CSN: [id number]    YOB: 1930  Age: 89 y.o.  Sex: female    DOA: 2/2/2024 LOS:  LOS: 5 days   Discharge Date:          Discharge Diagnoses:   Syncope- Vasovagal   New onset A-fib with RVR  Hypertension  Asymptomatic bacteriuria  Hyperlipidemia  Pneumonia  Very hard of hearing  Hyponatremia  Thyroid nodule  History of pulmonary embolism s/p IVC filter in 2000    Discharge Condition: Fair    Discharge To: Home    Consults: Cardiology    HPI: per Admitting MD \"HISTORY OF PRESENT ILLNESS:     This is a 89-year-old female with a history of hypertension who presented to the ED after she had a recurrent syncopal episode in the afternoon.  History is obtained from talking to the patient and from review of the chart and talking to the ED physician.  Patient is a poor historian.  I did try to call the patient's daughter but was not able to talk to her.  In the ED patient was noted to have atrial fibrillation with RVR.  Patient was also noted to have a UTI and pneumonia.  When I saw the patient she was laying in bed.  Patient is hard of hearing.  Patient is a poor historian.  Patient appears comfortable at this time.  Patient denies any chest pain or shortness of breath at this time.  Patient denies any dizziness.  Patient does not provide much details.            Hospital Course:     Admitted with Afib RVR - started on Cardizem gtt. , additional BB required to control HR . Now on PO Cardizem. HR controlled     No LTAC - on ASA due to high risk , falls . D/w patient's 2 daughters in room with patient .  Both daughters verbalized understanding that why patient is not on any long-term anticoagulation.  Patient has more risk than benefit with long-term anticoagulation.    Patient will continue to follow-up with PCP and cardiology    Patient DNR and DNI  For future family may consider hospice comfort  KNEE RIGHT (3 VIEWS)    Result Date: 2/2/2024  EXAMINATION: Right knee 3 views INDICATION: Right knee pain, swelling COMPARISON: None FINDINGS: 3 views. No acute fracture or subluxation identified. Medial compartment joint space loss. Marginal osteophytes. Faint chondrocalcinosis. No significant joint effusion. Superior patellar enthesophyte.     Advanced degenerative changes most pronounced in the medial compartment. No clearly acute findings.    CTA CHEST W WO CONTRAST PE Eval    Result Date: 2/2/2024  EXAMINATION: CTA chest pulmonary INDICATION: Elevated d-dimer, syncope, tachycardia COMPARISON: None TECHNIQUE: CTA of the pulmonary arteries with IV contrast and 3-D MIP reformations. All CT scans at this facility are performed using dose optimization technique as appropriate to a performed exam, to include automated exposure control, adjustment of the mA and/or kV according to patient size (including appropriate matching first site specific examinations), or use of iterative reconstruction technique. FINDINGS: Streak artifact from arms at side limits evaluation. Pulmonary arteries: No definite pulmonary artery filling defect identified to suggest pulmonary embolus. Motion artifact slightly limits evaluation of small segmental or subsegmental arteries. Overall specific evidence of right heart strain. Cardiovascular: Pulmonary arteries as above. Moderate burden of atherosclerotic calcifications including a few left coronary calcifications. Thoracic aorta normal in course and caliber. Moderate cardiomegaly. Mediastinum: Suspected 1.5 cm subtle right thyroid nodule. Prominent subcarinal node, and suggestion of mild esophageal wall thickening. Pleura: No pleural effusion. No pneumothorax. Lungs/airways: Patchy groundglass to consolidative opacities in the posterior lungs, predominantly in the dependent lower lobes. Upper abdomen: No acute findings. Miscellaneous: Superficial soft tissues unremarkable. Bones: No acute

## 2024-02-09 ENCOUNTER — HOME CARE VISIT (OUTPATIENT)
Age: 89
End: 2024-02-09

## 2024-02-09 LAB
BACTERIA SPEC CULT: NORMAL
BACTERIA SPEC CULT: NORMAL
SERVICE CMNT-IMP: NORMAL
SERVICE CMNT-IMP: NORMAL

## 2024-02-13 ENCOUNTER — HOME CARE VISIT (OUTPATIENT)
Age: 89
End: 2024-02-13

## 2024-02-13 VITALS
DIASTOLIC BLOOD PRESSURE: 72 MMHG | SYSTOLIC BLOOD PRESSURE: 110 MMHG | OXYGEN SATURATION: 99 % | TEMPERATURE: 97.8 F | HEART RATE: 77 BPM | RESPIRATION RATE: 20 BRPM

## 2024-02-13 PROCEDURE — 0221000100 HH NO PAY CLAIM PROCEDURE

## 2024-02-13 PROCEDURE — G0299 HHS/HOSPICE OF RN EA 15 MIN: HCPCS

## 2024-02-15 ENCOUNTER — HOME CARE VISIT (OUTPATIENT)
Age: 89
End: 2024-02-15

## 2024-02-15 PROCEDURE — G0151 HHCP-SERV OF PT,EA 15 MIN: HCPCS

## 2024-02-16 ENCOUNTER — HOME CARE VISIT (OUTPATIENT)
Age: 89
End: 2024-02-16

## 2024-02-16 VITALS
TEMPERATURE: 97.6 F | DIASTOLIC BLOOD PRESSURE: 66 MMHG | HEART RATE: 74 BPM | OXYGEN SATURATION: 98 % | SYSTOLIC BLOOD PRESSURE: 130 MMHG | RESPIRATION RATE: 18 BRPM

## 2024-02-16 PROCEDURE — G0300 HHS/HOSPICE OF LPN EA 15 MIN: HCPCS

## 2024-02-17 NOTE — HOME HEALTH
Nursing.    Plan for next visit: education    Patient and/or caregiver notified and agrees to changes in the Plan of Care: Yes.     The following discharge planning was discussed with the pt/caregiver: discharge when skilled level of care is no longer needed

## 2024-02-17 NOTE — PROGRESS NOTES
Physician Progress Note      PATIENT:               JOSE HARE  CSN #:                  [id number]  :                       10/6/1930  ADMIT DATE:       2024 5:55 PM  DISCH DATE:        2024 5:48 PM  RESPONDING  PROVIDER #:        Tanya Proctor MD          QUERY TEXT:    Patient admitted with afib. Noted documentation of pneumonia on H&P and DC   summary. In order to support the diagnosis of pna, please include additional   clinical indicators in your documentation.  Or please document if the   diagnosis of pna has been ruled out after further study.    The medical record reflects the following:  Risk Factors:  89 y.o. female with PMH significant for atrial arrhythmia,   hypertension, pulmonary embolism with IVC filter in  presents with   recurrent syncopal episode  Clinical Indicators: Per ED provider - Patient also may have urinary tract   infection and right-sided pneumonia based on urinalysis CT of the chest   findings.  Patient also intermittently hypoxic to 89% on room air.  Will cover   for both with Rocephin and azithromycin.  Per H&P - Patient was also noted to have a UTI and pneumonia. Hyponatremia.  Per 2/3 Med - On Rocephin and azithromycin for pneumonia coverage and this is   also covering a potential UTI as well.  2/4 Med - No Clear evidence of PNA - CT /Chest x ray negative DC antibiotics   and monitor  2/ CT - Patchy opacities in the posterior lungs, likely at least partly due   to atelectasis, but infectious/inflammatory component also suspected. Query   aspiration. Component of chronic interstitial lung disease also possible.  2/2 XRay - LUNGS: Mild atelectasis at bilateral lung bases. No focal   consolidation identified. No effusions identified. No pneumothorax identified.  2/4 XRay - Stable subtle bibasilar opacities and right midlung opacity, better   demonstrated on CT. No pneumothorax.  Treatment: Rocephin and azithromycin; antibiotics DC'd    Thank you,  Elvira Gracia RN,

## 2024-02-19 VITALS
TEMPERATURE: 98.6 F | SYSTOLIC BLOOD PRESSURE: 132 MMHG | RESPIRATION RATE: 18 BRPM | DIASTOLIC BLOOD PRESSURE: 76 MMHG | OXYGEN SATURATION: 96 % | HEART RATE: 56 BPM

## 2024-02-19 NOTE — HOME HEALTH
stiffness type of pain on painful area x 20 mins, positioning and relaxation.  PRESSURE ULCER PREVENTION TECHNIQUES:  proper positioning strategies including frequency of repositioning, prevention of shear and friction, appropriate skin hygiene and protection from moisture.  ENERGY CONSERVATION TECHNIQUES:  rest, slow down, pacing and complete tasks in manageable steps.  PRECAUTION: FALLS, VERY Ruby    PATIENT LEVEL OF UNDERSTANDING OF EDUCATION PROVIDED: Patient and cg verbalized understanding and able to partially teach back information provided.    PATIENT RESPONSE TO PROCEDURE PERFORMED: Patient exhibiting difficulty in bed mobility, transfers, gait,  steps management and unable safely care for herself. These functional deficits were caused by scar LE weakness, decreased balance, poor activity tolerance,  as evidenced by functional tests. FTSTS: unable, Tinetti and gait test: 6/28.    Patient unsafe to ambulate and  transfers to and from bed, chair, and toilet d/t weakness on scar LE, impaired balance, SOBs with moderate  exertion and  environmental barrier such as entry steps without rails.    Skilled PT services are necessary to increase safety bed mobility, transfers and gait to decrease risk of rehospitalization, decrease risk for falls  and restore patient to a level of functionality that CG can manage at home. Without therapy, patient is at risk for falls, further immobility, increased dependence upon caregivers and failure to return to PLOF.   -  Plan: Patient is seen for initial PT eval today with POC established. Discussed to patient POC and visit frequency of 1w1, 2w5. Patient verbalized agreement. HHPT to work on education on pain management techniques, graded therapeutic exercises, balance training, bed mobility training, transfers training, HEP ed, education on energy conservation techniques, fall prevention techniques ed, pressure ulcer prevention techniques, gait/steps management training and d/c

## 2024-02-20 ENCOUNTER — HOME CARE VISIT (OUTPATIENT)
Age: 89
End: 2024-02-20

## 2024-02-20 PROCEDURE — G0300 HHS/HOSPICE OF LPN EA 15 MIN: HCPCS

## 2024-02-21 ENCOUNTER — HOME CARE VISIT (OUTPATIENT)
Age: 89
End: 2024-02-21

## 2024-02-21 VITALS
DIASTOLIC BLOOD PRESSURE: 62 MMHG | OXYGEN SATURATION: 100 % | TEMPERATURE: 97.2 F | HEART RATE: 100 BPM | RESPIRATION RATE: 18 BRPM | SYSTOLIC BLOOD PRESSURE: 127 MMHG

## 2024-02-21 PROCEDURE — G0151 HHCP-SERV OF PT,EA 15 MIN: HCPCS

## 2024-02-21 NOTE — HOME HEALTH
Skilled reason for visit: Disease and Medication management    Caregiver involvement: Daughters are primary CG and manage patient care .    Medications reviewed and all medications are available in the home this visit.    The following education was provided regarding medications:  Juan Manuelitiazgianfranco instructed this medication is a calcium channel blocker that is used the treatment of hypertension, angina pectoris ans some types of arrhymia.  The side effects of this medication includes stuffy nose, skin rash  or itching.  MD notified of any discrepancies/look a-like medications/medication interactions: n/a  Medications are effective at this time.      Home health supplies by type and quantity ordered/delivered this visit include: n/a    Patient education provided this visit: medication teaching  safety and fall prevention education  nutrition education  skin care and assessment  pain management        Sharps education provided: n/a    Patient level of understanding of education provided: Patient verbalized complete understanding of education provided    Patient response to procedure performed:  n/a    Agency Progress toward goals: Contnue to educate     Patient's Progress towards personal goals: meeting personal goals     Home exercise program: keep all f/u appointment     Continued need for the following skills: Nursing.    Plan for next visit: education    Patient and/or caregiver notified and agrees to changes in the Plan of Care: yes    The following discharge planning was discussed with the pt/caregiver: Discharge when skilled level care is no longer needed

## 2024-02-23 ENCOUNTER — HOME CARE VISIT (OUTPATIENT)
Age: 89
End: 2024-02-23

## 2024-02-23 VITALS
HEART RATE: 73 BPM | OXYGEN SATURATION: 97 % | RESPIRATION RATE: 16 BRPM | DIASTOLIC BLOOD PRESSURE: 80 MMHG | SYSTOLIC BLOOD PRESSURE: 150 MMHG | TEMPERATURE: 98.7 F

## 2024-02-23 PROCEDURE — G0157 HHC PT ASSISTANT EA 15: HCPCS

## 2024-02-23 NOTE — HOME HEALTH
Patient's Subjective:  She is good.     HEP: Not yet, none given in writing, per CG.   Insurance Changes: no  Falls since last session:  no  Trips to ER since last session? no  Pain/Discomfort ?  See pain page.   New Meds: no  Upcoming MD appointments: 3/20/2024 11:40 AM Odell Ureña M    .  Caregiver involvement/assistance needed for:  The patient's caregiver assists with meal, meds, transportation  .  Home health supplies by type and quantity ordered/delivered this visit include: none  .  Objective:  See interventions.    Patient response to treatment:  RPE post amb and exercises 5-6/10.     Patient level of understanding of education provided:  - Introduced HEP. Pt to perform 2x/day to tolerance. AP a lot all through the day for circulation. Pt able to return demonstrate.  - Instructed to amb hourly even if only short distances. Education for how amb helps. Her CG reported understanding.   -Due to hearing loss, she requires instructions be written for her and demonstration throughout. With this she is able to participate. Some increased time due to this.     Assess of progress towards goals:   Pt presents with stable gait today with RW. She does amb fast and requires close SBA  Her CG denies any falls to date.  She denied any pain or discomfort.    Continued need for the following skills: meals, meds, writing down her instructions for her due to hearing loss. Transportation, chores    Plan for next visit: Progress exercises to her tolerance.     The following discharge was discussed with the patient/caregiver :  Estimated DC date:3/21/2024 or when goals are met, by Ian Mejias, PT Pt's family report understanding.     NOMNC required? Yes

## 2024-02-24 ENCOUNTER — HOME CARE VISIT (OUTPATIENT)
Age: 89
End: 2024-02-24

## 2024-02-26 ENCOUNTER — HOME CARE VISIT (OUTPATIENT)
Age: 89
End: 2024-02-26

## 2024-02-26 VITALS
TEMPERATURE: 98.2 F | OXYGEN SATURATION: 97 % | HEART RATE: 91 BPM | SYSTOLIC BLOOD PRESSURE: 140 MMHG | DIASTOLIC BLOOD PRESSURE: 80 MMHG | RESPIRATION RATE: 20 BRPM

## 2024-02-26 PROCEDURE — G0157 HHC PT ASSISTANT EA 15: HCPCS

## 2024-02-26 ASSESSMENT — ENCOUNTER SYMPTOMS: PAIN LOCATION - PAIN QUALITY: ARTHRITIC

## 2024-02-26 NOTE — HOME HEALTH
Patient's Subjective:  She wants to go to her house.   HEP: Per the patient's CG \"Half haggardly\"  Insurance Changes:no  Falls since last session: no  Trips to ER since last session? no  Pain/Discomfort ?  See pain page.   New Meds: no  Upcoming MD appointments: 3/20/2024 11:40 AM Odell Ureña M .    Caregiver involvement/assistance needed for:  The patient's caregiver assists with meal, meds, transportation  .  Home health supplies by type and quantity ordered/delivered this visit include: none  .  Objective:  See interventions.    Patient response to treatment:  Good for amb and Fair for transfers and side stepping.     Patient level of understanding of education provided:  - Educated the patient on the importance of following her HEP: Amb hourly when awake and exercises 3x/day to her tolerance to progress and be able to amb to her home across the street. She is now very eager to participate in hopes of amb to her home soon.   - Education for her exercises for correct techniques for best outcomes.   -Education for the CG's on pain scale and what I will be asking each visit to prepare her to increase ease of answering and less time writing down the questions. They report understanding.     Assess of progress towards goals:  Vitals at expected parameters pre/post exercises, but at max for her activity tolerance. Educated the family on BP and when/how to monitor to determine her allowable activity. They reported understanding.    Continued need for the following skills: meals, meds, writing down her instructions for her due to hearing loss. Transportation, chores     Plan for next visit: Cont with strengthening and stability activities.     Progress exercises to her tolerance.    Increased time each visit due to some confusion with all activity, due to her being [place] and she requires all things be written for her.  She was able to amb x 4 min in the home before needing to sit.  She is demonstrating improved

## 2024-02-27 ENCOUNTER — HOME CARE VISIT (OUTPATIENT)
Age: 89
End: 2024-02-27

## 2024-02-27 VITALS
RESPIRATION RATE: 18 BRPM | HEART RATE: 78 BPM | OXYGEN SATURATION: 98 % | DIASTOLIC BLOOD PRESSURE: 74 MMHG | SYSTOLIC BLOOD PRESSURE: 132 MMHG | TEMPERATURE: 98.2 F

## 2024-02-27 VITALS
DIASTOLIC BLOOD PRESSURE: 82 MMHG | SYSTOLIC BLOOD PRESSURE: 126 MMHG | OXYGEN SATURATION: 96 % | RESPIRATION RATE: 12 BRPM | HEART RATE: 82 BPM | TEMPERATURE: 97.8 F

## 2024-02-27 VITALS
HEART RATE: 72 BPM | OXYGEN SATURATION: 96 % | SYSTOLIC BLOOD PRESSURE: 122 MMHG | RESPIRATION RATE: 19 BRPM | TEMPERATURE: 98.3 F | DIASTOLIC BLOOD PRESSURE: 71 MMHG

## 2024-02-27 PROCEDURE — G0152 HHCP-SERV OF OT,EA 15 MIN: HCPCS

## 2024-02-27 PROCEDURE — G0300 HHS/HOSPICE OF LPN EA 15 MIN: HCPCS

## 2024-02-27 ASSESSMENT — ENCOUNTER SYMPTOMS: PAIN LOCATION - PAIN QUALITY: ACHY

## 2024-02-27 NOTE — HOME HEALTH
understanding:     IADL: Pt family completes all IADLs, however pt would like to return to completing simple home management tasks  AMBULATION: Pt CGA for ambulation using FWW for support.  EOB/BED TRANSFER: Pt sleeps in her lift chair and is resistive to sleeping in hospital bed  COUCH: Pt CGA for sit to stand transfers using lift chair features on recliner  TOILET: Pt minimal assistance for toilet transfers  TUB SHOWER: Pt minimal assistance for shower transfers into/out of step in shower     PATIENT RESPONSE TO TREATMENT: Pt responded well to skilled home health occupational therapy assessment with no increase in pain throughout    PATIENT EDUCATION PROVIDED THIS VISIT: OT role, adaptive equipment, RUE HEP, energy conservation, fall prevention/safety training ADL/IADL tasks, continue diet and medications as instructed per MD, consult MD or urgent care for medical assistance as opposed to ER unless situation emergent.    PATIENT LEVEL OF UNDERSTANDING OF EDUCATION PROVIDED: Pt able to teach back role of OT with good understanding. Pt able to teach back energy conservation while performing bathing, dressing, and setup such as set clothing out night before, gather items required to perform task in one trip, sit while performing tasks, take rest breaks as needed, perform shower/bathing on days with no other appointments or activities scheduled, etc. Pt able to teach back education on BUE strengthening HEP to increase strength needed to complete daily routine.      REHAB POTENTIAL: Mrs. Quinn presents with good rehab potential to increase their strength, balance and safety needed for increasing their independence within their daily routine. Pt with general deconditioning and weakness impacting her safety and independence within her daily routine. Pt and caregiver agreeable to continued home health occupational therapy services to increase their safety and independence within their home environment.     HOME EXERCISE

## 2024-02-27 NOTE — HOME HEALTH
SUBJECTIVE: Patient reported she has npt  felt  dizzy or lightheaded for few days now except this morning. Patient reported dizziness is resolved now. Patient c/o chronic achy pain on R knee  with highest pain level of 6/10 and least pain level of 0/10 for the past 24 hours. Patient instructed on pain management techniques such as application of ice packs for soreness and achy  type of pain /hot  packs for achy, stiffness type of pain on painful area x 20 mins, positioning and relaxation.    CAREGIVER INVOLVEMENT/ASSISTANCE NEEDED FOR: 2 daughters present during this visit  .  OBJECTIVE:  See interventions.  PATIENT EDUCATION PROVIDED THIS VISIT: Fall preventions, energy conservation and HEP which includes: seated kickouts, marches and supported standing HT raises.  PATIENT RESPONSE TO EDUCATION PROVIDED: Patient and cg verbalized understanding and able to repeat back.  PATIENT RESPONSE TO TREATMENT: Patient denies dizziness and light headedness through this visit. Patient requires rest break in between exercises/ task d/t c/o SOBs. SpO2 ranges between 95-98 % at room air, HR ranges between 82-98 bpm.   .  ASSESSMENT OF PROGRESS TOWARD GOALS: Patient progressing towards goals as evidenced by patient ability to tolerate unsupported dynamic standing balance exercises without c/o dizziness and lightheadness.  .  PLAN FOR NEXT VISIT: scar LEs strengthening, balance training, activity tolerance training and safety in functional mobility skills.  THE FOLLOWING DISCHARGE PLANNING WAS DISCUSSED WITH THE PATIENT/CAREGIVER: Discharge to self, family and under the supervision of MD once patient has met all goals or reached maximum potential.

## 2024-02-27 NOTE — HOME HEALTH
Skilled reason for visit: Patient education    Caregiver involvement: Daughters directly involved with care.    Medications reviewed and all medications are available in the home this visit.    The following education was provided regarding medications:  Continue current medication regimen as ordered by HCP.    MD notified of any discrepancies/look a-like medications/medication interactions: n/a  Medications are effective at this time.      Home health supplies by type and quantity ordered/delivered this visit include: N/a    Patient education provided this visit: Patient safety, fall prevention, reduce risks of skin breakdown, signs of a-fib/understanding when to notify HCP, infection control and prevention    Sharps education provided: n/a    Patient level of understanding of education provided: Requires assistence with communication via written communication and assistance of caregivers    Patient response to procedure performed:  Requires assistence with communication via written communication and assistance of caregivers, caregivers are very active and present with care and knowlegeable of ways to reduce infection, falls, etc    Agency Progress toward goals: progressing    Patient's Progress towards personal goals: progressing    Home exercise program: Ambulating and walking short distances    Continued need for the following skills: Nursing and Physical Therapy.    Plan for next visit: Patient education    Patient and/or caregiver notified and agrees to changes in the Plan of Care: Yes.     The following discharge planning was discussed with the pt/caregiver: Discharge planning was briefly discussed, but encouraged to delay as patient adjusts and learn more about new diagnosis.

## 2024-02-29 ENCOUNTER — HOME CARE VISIT (OUTPATIENT)
Age: 89
End: 2024-02-29

## 2024-02-29 VITALS
OXYGEN SATURATION: 98 % | RESPIRATION RATE: 16 BRPM | HEART RATE: 67 BPM | SYSTOLIC BLOOD PRESSURE: 135 MMHG | DIASTOLIC BLOOD PRESSURE: 85 MMHG | TEMPERATURE: 98.3 F

## 2024-02-29 VITALS
OXYGEN SATURATION: 97 % | SYSTOLIC BLOOD PRESSURE: 150 MMHG | HEART RATE: 61 BPM | TEMPERATURE: 97.9 F | DIASTOLIC BLOOD PRESSURE: 78 MMHG

## 2024-02-29 PROCEDURE — G0157 HHC PT ASSISTANT EA 15: HCPCS

## 2024-02-29 PROCEDURE — G0158 HHC OT ASSISTANT EA 15: HCPCS

## 2024-02-29 NOTE — HOME HEALTH
Patient's Subjective: She is fine and no pain.   Per CG: She is doing good. OT say her. Her BP was a little high. She finally had a BM.   HEP:  Yes,    Insurance Changes:  Falls since last session:  no  Trips to ER since last session?  no  Pain/Discomfort ?  See pain page.   New Meds: no  Upcoming MD appointments: 3/20/2024 11:40 AM Odell Ureña M     Caregiver involvement/assistance needed for:  The patient's caregiver assists with meal, meds, transportation   .  Home health supplies by type and quantity ordered/delivered this visit include:  none  .  Objective:  See interventions.    Patient response to treatment:     Patient level of understanding of education provided:  - Fair understanding with return demonstration for exercises and techniques. She requires demonstration and written instructions at times due to being [place]. She also has difficulty reading lips.   - Sit/stand with correct return demonstration. She requires re education throughout.    Assess of progress towards goals:    Pt able to perform exercises for HEP with a couple of seated rest breaks due to fatigue. She does not report increased pain except some soreness B knees from arthritis.   She has not had any falls to date.   She is walking more since having it as part of her HEP.     Continued need for the following skills: meals, meds, writing down her instructions for her due to hearing loss. Transportation, chores     Plan for next visit: Tinetti Balance Assessment and  cont we gait training    The following discharge was discussed with the patient/caregiver :  Estimated DC date:3/21/2024 or when goals are met, by Ian Mejias, PT Pt's family report understanding.     NOMNC required? Yes

## 2024-03-01 ENCOUNTER — HOME CARE VISIT (OUTPATIENT)
Age: 89
End: 2024-03-01

## 2024-03-01 VITALS
HEART RATE: 60 BPM | OXYGEN SATURATION: 99 % | TEMPERATURE: 97.7 F | DIASTOLIC BLOOD PRESSURE: 70 MMHG | RESPIRATION RATE: 18 BRPM | SYSTOLIC BLOOD PRESSURE: 112 MMHG

## 2024-03-01 PROCEDURE — G0299 HHS/HOSPICE OF RN EA 15 MIN: HCPCS

## 2024-03-01 ASSESSMENT — ENCOUNTER SYMPTOMS
HEMOPTYSIS: 0
DYSPNEA ACTIVITY LEVEL: AFTER AMBULATING MORE THAN 20 FT

## 2024-03-01 NOTE — HOME HEALTH
Skilled reason for visit: SN discipline discharge     Caregiver involvement: Primary caregiver is daughters available 24/7 and is able to assist with bathing, dressing, walking, bathroom, meal prep and setup, fill med box, grocery shopping and household chores  .    Medications reviewed and all medications are available in the home this visit.    The following education was provided regarding medications:   patient to continue to take medications as prescribed. patient aware to monitor for effectiveness and to notify staff of any adverse reactions to medications/any changes to medication regimen.  .    MD notified of any discrepancies/look a-like medications/medication interactions: no discrepancies  Medications are  effective at this time.      Home health supplies by type and quantity ordered/delivered this visit include: na     Patient education provided this visit: discussed fall precautions in detail- having lighted hallways, removing throw rugs, monitoring medication that may alter mental status. perform skin inspections looking for any skin breakdown or redness. monitor for edema. frequent position changes. Watch for signs and symptoms of infection which include; fever, drainage, foul smell, lethargy.        Sharps education provided:  Containers should be made of hard plastic, be puncture-resistant and leakproof,   such as a laundry detergent or bleach bottle.  When the container is ¾ full, it should be sealed with tape and labeled   DO NOT RECYCLE prior to discarding in the regular trash.          Patient level of understanding of education provided:  pt/caregiver verbalized understanding of all education provided this visit      Skilled Care Performed this visit: SEE CAREPLAN    Patient response to procedure performed:   pt tolerated assessment with no complaints of pain      Agency Progress toward goals: goals met     Patient's Progress towards personal goals: goals met     Home exercise program: follow

## 2024-03-05 ENCOUNTER — HOME CARE VISIT (OUTPATIENT)
Age: 89
End: 2024-03-05
Payer: MEDICARE

## 2024-03-05 VITALS
TEMPERATURE: 98.9 F | OXYGEN SATURATION: 98 % | RESPIRATION RATE: 16 BRPM | DIASTOLIC BLOOD PRESSURE: 80 MMHG | HEART RATE: 97 BPM | SYSTOLIC BLOOD PRESSURE: 130 MMHG

## 2024-03-05 PROCEDURE — G0157 HHC PT ASSISTANT EA 15: HCPCS

## 2024-03-05 NOTE — HOME HEALTH
Patient's Subjective: She was constipated yesterday due to cutting back on her stool softeners. She is back on them and had a BM and feels better.   Her family reported that she was really tired after her last PT session, but a \"Good\" tired. She is motivated to eventually amb to her home across the street.   HEP:  yes   Insurance Changes:  no  Falls since last session:   no  Trips to ER since last session?   Pain/Discomfort ?  See pain page.   New Meds:  no  Upcoming MD appointments: 3/20/2024 11:40 AM Odell Ureña M      Caregiver involvement/assistance needed for:  The patient's caregiver assists with meal, meds, transportation   .  Home health supplies by type and quantity ordered/delivered this visit include: no  .  Objective:  See interventions.    Patient response to treatment: Fair+ tolerance to her session.     Patient level of understanding of education provided:  - Education to patient the need to keep her walker with her at all times when amb and with her for transfers. Her daughters report that she is sometimes trying to amb away from it or not use it when transferring. She reported that she will use it at all times.   - HEP- Cont to work on amb tolerance.     Assess of progress towards goals:   Pt is actively practicing controlling her descent when sitting in her recliner. She is able to demonstrate ~ 25% improvement.   Tinetti Balance Assessment: 15/28 vs 6/28 at evaluation indicating improved stability and decreased fall risk. She is progressing towards her goal of >/= 19/28 medium fall risk.    Continued need for the following skills: meals, meds, writing down her instructions for her due to hearing loss. Transportation, chores     Plan for next visit: Reassessment     The following discharge was discussed with the patient/caregiver :  Estimated DC date:3/21/2024 or when goals are met, by Ian Mejias, PT Pt's family report understanding.     NOMNC required? Yes

## 2024-03-06 ENCOUNTER — HOME CARE VISIT (OUTPATIENT)
Age: 89
End: 2024-03-06
Payer: MEDICARE

## 2024-03-06 VITALS
DIASTOLIC BLOOD PRESSURE: 75 MMHG | TEMPERATURE: 97.9 F | OXYGEN SATURATION: 97 % | HEART RATE: 76 BPM | SYSTOLIC BLOOD PRESSURE: 142 MMHG

## 2024-03-06 PROCEDURE — G0158 HHC OT ASSISTANT EA 15: HCPCS

## 2024-03-07 ENCOUNTER — HOME CARE VISIT (OUTPATIENT)
Age: 89
End: 2024-03-07
Payer: MEDICARE

## 2024-03-07 PROCEDURE — G0151 HHCP-SERV OF PT,EA 15 MIN: HCPCS

## 2024-03-07 NOTE — HOME HEALTH
level.  .  ASSESSMENT OF PROGRESS TOWARD GOALS: The patient is making progress with functional transfers, and utilizining energy conservation strategies during bathing/dressing.  .  PLAN FOR NEXT VISIT: Focus on HEP, and standing balance/tolerance to decrease the risks of falls.    THE FOLLOWING DISCHARGE PLANNING WAS DISCUSSED WITH THE PATIENT/CAREGIVER: Discharge the patient to self with family and HEP,  when all goals are met or maximum potential has been reached.  There are 3 visits remaining.

## 2024-03-11 ENCOUNTER — HOME CARE VISIT (OUTPATIENT)
Age: 89
End: 2024-03-11
Payer: MEDICARE

## 2024-03-11 VITALS
OXYGEN SATURATION: 98 % | HEART RATE: 72 BPM | TEMPERATURE: 97.7 F | DIASTOLIC BLOOD PRESSURE: 84 MMHG | SYSTOLIC BLOOD PRESSURE: 157 MMHG

## 2024-03-11 PROCEDURE — G0158 HHC OT ASSISTANT EA 15: HCPCS

## 2024-03-12 NOTE — HOME HEALTH
PATIENT/CAREGIVER: Discharge the patient to self and family members, with HEP when all goals are met or maximum potential has been reached. There is one visit remaining.

## 2024-03-14 ENCOUNTER — HOME CARE VISIT (OUTPATIENT)
Age: 89
End: 2024-03-14
Payer: MEDICARE

## 2024-03-14 PROCEDURE — G0151 HHCP-SERV OF PT,EA 15 MIN: HCPCS

## 2024-03-15 ENCOUNTER — HOME CARE VISIT (OUTPATIENT)
Age: 89
End: 2024-03-15
Payer: MEDICARE

## 2024-03-15 VITALS
HEART RATE: 95 BPM | SYSTOLIC BLOOD PRESSURE: 118 MMHG | TEMPERATURE: 97.5 F | OXYGEN SATURATION: 96 % | DIASTOLIC BLOOD PRESSURE: 68 MMHG | RESPIRATION RATE: 16 BRPM

## 2024-03-15 VITALS
RESPIRATION RATE: 17 BRPM | SYSTOLIC BLOOD PRESSURE: 132 MMHG | DIASTOLIC BLOOD PRESSURE: 74 MMHG | TEMPERATURE: 97.7 F | OXYGEN SATURATION: 98 % | HEART RATE: 74 BPM

## 2024-03-15 VITALS
RESPIRATION RATE: 14 BRPM | DIASTOLIC BLOOD PRESSURE: 80 MMHG | OXYGEN SATURATION: 97 % | TEMPERATURE: 98.2 F | HEART RATE: 95 BPM | SYSTOLIC BLOOD PRESSURE: 132 MMHG

## 2024-03-15 PROCEDURE — G0151 HHCP-SERV OF PT,EA 15 MIN: HCPCS

## 2024-03-15 PROCEDURE — G0152 HHCP-SERV OF OT,EA 15 MIN: HCPCS

## 2024-03-15 ASSESSMENT — ENCOUNTER SYMPTOMS: PAIN LOCATION - PAIN QUALITY: ACHY

## 2024-03-15 NOTE — HOME HEALTH
and gait  demonstrating efficacy of therapy services.  .  DISCHARGE PLANNING DISCUSSED: Care coordination done with PTA regarding progress made towards goal, POC and d/c plans. D/c to self, family under MD supervision when all goals are met. Patient verbalized agreement.

## 2024-03-15 NOTE — HOME HEALTH
SUBJECTIVE: Pt reports no pain on this date. Pt daughters present throughout occupational therapy session.    CAREGIVER INVOLVEMENT/ASSISTANCE NEEDED FOR: Pt daughters assist with IADLs as needed    HOME HEALTH SUPPLIES BY TYPE AND QUANTITY ORDERED/DELIVERED THIS VISIT INCLUDE: N/A    OBJECTIVE: See interventions    PATIENT RESPONSE TO TREATMENT: Pt responded well to occupational therapy session on this date with no increase in pain throughout    PATIENT LEVEL OF UNDERSTANDING OF EDUCATION PROVIDED: Pt educated on continuing to complete BUE strengthening HEP to maintain/increase strength needed to complete ADLs, IADLs, and functional mobility. Pt educated on continuing to use energy conservation strategies within their daily routine to decrease fatigue and risk of falls. Pt educated on notifying MD of any changes in health status.    OCCUPATIONAL THERAPY DISCHARGE: Mrs. Quinn has been seen by skilled home health occupational therapy services to address deficits in ADLs, IADLs, functional mobility, BUE function, and balance. Pt has reached maximal potential and is agreeable/ready to discharge at this time. ADLs: Pt has progressed to completing upper body dressing with setup assistance. Pt has progressed to completing lower body dressing with CGA for safety. IADLs: Pt has progressed to completing simple meal prep with CGA. BUE Function: Pt has progressed to completing BUE strengthening HEP with resistance of soup can with supervision. Energy conservation: Per pt and caregiver report, she completes all tasks that he is able while seated, allows for adequate rest breaks, and completes bathing on days with no other appointments. Functional Mobility: Pt has progressed to completing functional transfers from recliner, commode, and shower with supervision. Balance: Pt has progressed dynamic standing balance of fair- using rolling walker. Pt medications reconciled. Pt MD notified of discipline DC.

## 2024-03-18 ASSESSMENT — ENCOUNTER SYMPTOMS
PAIN LOCATION - PAIN QUALITY: ACHY
PAIN LOCATION - PAIN QUALITY: ACHY

## 2024-03-19 ENCOUNTER — HOME CARE VISIT (OUTPATIENT)
Age: 89
End: 2024-03-19
Payer: MEDICARE

## 2024-03-19 VITALS
HEART RATE: 76 BPM | TEMPERATURE: 98.4 F | SYSTOLIC BLOOD PRESSURE: 135 MMHG | DIASTOLIC BLOOD PRESSURE: 85 MMHG | RESPIRATION RATE: 16 BRPM | OXYGEN SATURATION: 98 %

## 2024-03-19 PROCEDURE — G0157 HHC PT ASSISTANT EA 15: HCPCS

## 2024-03-19 ASSESSMENT — ENCOUNTER SYMPTOMS: PAIN LOCATION - PAIN QUALITY: ARTHRITIC

## 2024-03-19 NOTE — HOME HEALTH
During reassessment, pt. presents with the following clinical findings:   .  SUBJECTIVE: Patient denies fall since evaluation. Patient reported her legs were sore this morning. Patient reported she is walking ok using FWW. Patient denies dizziness or lightheadedness.  Cg reported patient has been wanting to move back to her own house. Patient's daughter would like to see if patient will be safe moving her house. Cg stated they will modify some things to make sure patient's walkways are clear.  .  CAREGIVER ASSISTANCE: Daughter present during this visit.  .  MEDICATIONS RECONCILED AND UPDATED: no changes in medications at this time.  .  WOUND: No wound noted during this visit.  .  MMT:R hip flex 4/5   R hip Abd 4/5   R hip add 4/5   R knee flex 4-/5  R knee ext. 3+/5   R ankle DF 4-/5  L hip flex 4/5  L hip Abd 4/5  L hip add 4/5  L knee flex 4-/5  L knee ext. 4-/5  L ankle DF 4-/5   FTSTS: unable. requires scar UE assistance and supervision assistance to safely perform sit to stand.  compared to R hip flex 4-/5   R hip Abd 4-/5   R hip add 4-/5   R knee flex 4-/5  R knee ext. 3+/5   R ankle DF 4-/5  L hip flex 4-/5  L hip Abd 4-/5  L hip add 4-/5  L knee flex 4-/5  L knee ext. 4-/5  L ankle DF 4-/5   FTSTS: unable. requires scar UE assistance and supervision assistance to safely perform sit to stand during reassessment visit.  BALANCE: 20/28 on Tinetti balance and gait test  compared to 15/28 during reassessment visit.  .  BED MOBILITY: set up assistance to indep compared to supervision assistance  during reassessment visit.  TRANSFERS: supervision to set up assistance in transfers to and from bed, chair and toilet using FWW compared to supervision assistance during reassessment visit.  GAIT: able to ambulate 150ft indoor using FWW with supervision to set up assistance, able to ambulate 25ft outdoor using FWW with touching assistance compared to able to ambulate 150ft indoors using FWW with supervision assistance.

## 2024-03-19 NOTE — HOME HEALTH
SUBJECTIVE: Cg reported patient able to get herself ready this morning with supervision assistance. Patient denies feeling lightheadedness.  .  CAREGIVER INVOLVEMENT/ASSISTANCE NEEDED FOR: Daughters presents during this visit and able to provide care and assistance 24/7.  .  OBJECTIVE:  See interventions.  .  PATIENT EDUCATION PROVIDED THIS VISIT: Fall prevention, energy conservation.  .  PATIENT RESPONSE TO EDUCATION PROVIDED: Patient able to return demonstrate energy conservation as evidenced by performing pursed lip breathing after task.   .  PATIENT RESPONSE TO TREATMENT: Patient demonstrates anxiousness performing steps management training. Patient requires encouragement and frequent verbal cues on proper hand placements and techniques (negotiating steps sideways with hands over daughter' shoulders). Going down with bad leg (R), going up with good leg (L). Patient requires moderate assist x 2 on first trial, minimal assistance descending, moderate assistance ascending on 2nd trial. Noted  bpm post task and needed 1 minute to regained 87bpm HR.   .  ASSESSMENT OF PROGRESS TOWARD GOALS: Patient progressing towards goal as evidenced by patient ambulating indoor with supervision to set up assistance using FWW.  .  PLAN FOR NEXT VISIT: scar LEs strengthening, steps management training, outdoor gait training, car transfers and activity tolerance training.  .  THE FOLLOWING DISCHARGE PLANNING WAS DISCUSSED WITH THE PATIENT/CAREGIVER: Discharge to self, family and under the supervision of MD once patient has met all goals or reached maximum potential.

## 2024-03-19 NOTE — HOME HEALTH
Patient's Subjective: She is good. She is doing her HEP. She is able to increase her time with amb.    HEP: no  Insurance Changes: no  Falls since last session:  no  Trips to ER since last session? no  Pain/Discomfort ?  See pain page.   New Meds: no  Upcoming MD appointments: 3/20/2024 11:40 AM Odell Ureña MD   .  Caregiver involvement/assistance needed for:  The patient's caregiver assists with meals, meds, transportation, chores, ADL's, gait  .  Home health supplies by type and quantity ordered/delivered this visit include:  no  .  Objective:  See interventions.    Patient response to treatment: Pt with Fair + tolerance to step negotiation. Once she performed the negotiation, she increased in confidence and required less physical assist.     Patient level of understanding of education provided:  - Correct and safe step negotiation with the patient and her daughters being ability to perform, by the end of the session.  - Education and demonstration for to don/doff and use the gait belt. Both her daughters were able to return demonstrate correctly.     Assess of progress towards goals: Pt is able to negotiate the steps for her MD appt tomorrow. She still has some fear, but it was lessened by the end of her session today.   Her sx are chronic from arthritis. She controls them with movement and Biofreeze roll on. She denies any new falls to date.     Continued need for the following skills: meals, meds,  transportation, chores  She requires writing down her instructions for her due to hearing loss.      Plan for next visit: Cont with step negotiation training assess her ability to get in her home and perform transfers in her home.      The following discharge was discussed with the patient/caregiver :  Estimated DC date:3/21/2024 or when goals are met, by Ian Mejias, PT Pt's family report understanding.       NOMNC required? Yes

## 2024-03-20 ENCOUNTER — OFFICE VISIT (OUTPATIENT)
Age: 89
End: 2024-03-20
Payer: MEDICARE

## 2024-03-20 VITALS
HEART RATE: 99 BPM | HEIGHT: 67 IN | BODY MASS INDEX: 21.03 KG/M2 | WEIGHT: 134 LBS | DIASTOLIC BLOOD PRESSURE: 72 MMHG | OXYGEN SATURATION: 98 % | SYSTOLIC BLOOD PRESSURE: 118 MMHG

## 2024-03-20 DIAGNOSIS — I45.10 UNSPECIFIED RIGHT BUNDLE-BRANCH BLOCK: ICD-10-CM

## 2024-03-20 DIAGNOSIS — I10 ESSENTIAL (PRIMARY) HYPERTENSION: ICD-10-CM

## 2024-03-20 DIAGNOSIS — R55 SYNCOPE AND COLLAPSE: ICD-10-CM

## 2024-03-20 DIAGNOSIS — R00.2 PALPITATIONS: Primary | ICD-10-CM

## 2024-03-20 DIAGNOSIS — I49.8 OTHER SPECIFIED CARDIAC ARRHYTHMIAS: ICD-10-CM

## 2024-03-20 PROCEDURE — 1036F TOBACCO NON-USER: CPT | Performed by: INTERNAL MEDICINE

## 2024-03-20 PROCEDURE — 1123F ACP DISCUSS/DSCN MKR DOCD: CPT | Performed by: INTERNAL MEDICINE

## 2024-03-20 PROCEDURE — G8427 DOCREV CUR MEDS BY ELIG CLIN: HCPCS | Performed by: INTERNAL MEDICINE

## 2024-03-20 PROCEDURE — 93000 ELECTROCARDIOGRAM COMPLETE: CPT | Performed by: INTERNAL MEDICINE

## 2024-03-20 PROCEDURE — 1090F PRES/ABSN URINE INCON ASSESS: CPT | Performed by: INTERNAL MEDICINE

## 2024-03-20 PROCEDURE — G8420 CALC BMI NORM PARAMETERS: HCPCS | Performed by: INTERNAL MEDICINE

## 2024-03-20 PROCEDURE — 99214 OFFICE O/P EST MOD 30 MIN: CPT | Performed by: INTERNAL MEDICINE

## 2024-03-20 PROCEDURE — G8484 FLU IMMUNIZE NO ADMIN: HCPCS | Performed by: INTERNAL MEDICINE

## 2024-03-20 ASSESSMENT — PATIENT HEALTH QUESTIONNAIRE - PHQ9
SUM OF ALL RESPONSES TO PHQ QUESTIONS 1-9: 0
SUM OF ALL RESPONSES TO PHQ QUESTIONS 1-9: 0
2. FEELING DOWN, DEPRESSED OR HOPELESS: NOT AT ALL
1. LITTLE INTEREST OR PLEASURE IN DOING THINGS: NOT AT ALL
SUM OF ALL RESPONSES TO PHQ QUESTIONS 1-9: 0
SUM OF ALL RESPONSES TO PHQ9 QUESTIONS 1 & 2: 0
SUM OF ALL RESPONSES TO PHQ QUESTIONS 1-9: 0

## 2024-03-20 NOTE — PROGRESS NOTES
HISTORY OF PRESENT ILLNESS  Eda Quinn  89 y.o. female     Chief Complaint   Patient presents with    Follow-up     4 weeks a-fib    Palpitations       ASSESSMENT and PLAN    The primary encounter diagnosis was Palpitations. Diagnoses of Syncope and collapse, Unspecified right bundle-branch block, Other specified cardiac arrhythmias, and Essential (primary) hypertension were also pertinent to this visit.    Ms. Eda Quinn has history of hypertension and palpitations. she has no complaints of or history of samuel syncope. She has irregular heartbeats. Her Holter monitor obtained in October of 2014 showed mainly sinus rhythm, with arrhythmia. There were no significant ventricular tachycardic events. There were PVC's and limited SVT's.  In February 2017, she presented to the emergency room with dizziness and syncope.  She was noted to be dehydrated.  Since that time, she has not had any further episodes.  For her orthostatic dizziness, I did advise her to be careful and take her time.  In January 2020, she presented to the emergency room with a syncopal episode.  Apparently, she had been struggling with fevers, chills, nausea and malaise for about a week and was subsequently diagnosed with UTI.  According to the daughter, patient was somewhat dehydrated and she subsequently passed out while coming home from PCP visit.  She was seen in the emergency room and was subsequently discharged home.  Her echocardiogram done in January 2021 showed normal LV function with EF 55-60%, moderate pulmonary hypertension with PA pressure 54 mmHg, mild MR.  She was hospitalized in February 2024 at [facility] when she presented with syncope.  She was noted to be in new onset atrial fibrillation with RVR.  Decision was made not to use chronic oral anticoagulation with her age, and unsteady gait.    CAD:    She has no documented history of significant CAD.  CAF:   Remains in atrial fibrillation at 99 bpm.  BP:

## 2024-03-20 NOTE — PROGRESS NOTES
Eda Quinn presents today for   Chief Complaint   Patient presents with    Follow-up     4 weeks a-fib    Palpitations       Eda Quinn preferred language for health care discussion is english/other.    Is someone accompanying this pt? no    Is the patient using any DME equipment during OV? no    Depression Screening:  Depression: Not at risk (3/20/2024)    PHQ-2     PHQ-2 Score: 0        Learning Assessment:  Who is the primary learner? Patient    What is the preferred language for health care of the primary learner? ENGLISH    How does the primary learner prefer to learn new concepts? DEMONSTRATION    Answered By patient    Relationship to Learner SELF           Pt currently taking Anticoagulant therapy? no    Pt currently taking Antiplatelet therapy ? aspirin      Coordination of Care:  1. Have you been to the ER, urgent care clinic since your last visit? Hospitalized since your last visit? no    2. Have you seen or consulted any other health care providers outside of the [facility] since your last visit? Include any pap smears or colon screening. no

## 2024-03-21 ENCOUNTER — HOME CARE VISIT (OUTPATIENT)
Age: 89
End: 2024-03-21
Payer: MEDICARE

## 2024-03-21 PROCEDURE — G0157 HHC PT ASSISTANT EA 15: HCPCS

## 2024-03-22 VITALS
HEART RATE: 70 BPM | SYSTOLIC BLOOD PRESSURE: 130 MMHG | OXYGEN SATURATION: 100 % | DIASTOLIC BLOOD PRESSURE: 70 MMHG | TEMPERATURE: 98.4 F

## 2024-03-22 NOTE — HOME HEALTH
Patient's Subjective: She was able to get out of the house for her MD appointment.     HEP: Yes, some.   Insurance Changes: no  Falls since last session:  no  Trips to ER since last session?   Pain/Discomfort ?  See pain page.   New Meds: OTC for nausea/motion sickness  Dimenhydrinate 50 mg   Pt to take 1-2 pills 30 min to 1 hour before activity i.e. car rides.  As needed for allergies: OTC Acetaminophen  mg with Diphenhydramine CL 25 mg Instructions say take 2, but they prefer to give her 1.     Upcoming MD appointments: DR Ureña will see her again in 6 months.   .  Caregiver involvement/assistance needed for:  The patient's caregiver assists with meals, meds,  transportation, chores  She requires writing down her instructions for her due to hearing loss.       .  Home health supplies by type and quantity ordered/delivered this visit include: none  .  Objective:  See interventions.    Patient response to treatment:  Fair tolerance today. No reports arthritic sx.     Patient level of understanding of education provided:  - Re education - minimal, but for safest step negotiation. She and her daughters are demonstrating improved step negotiation each time they demonstrate.   - Re education for correct transfers- use arm rests. She reported understanding. But was trying to challenge herself and not use her hands.       Assess of progress towards goals:   She is now amb more frequently and getting up and out of her recliner. She has no pressure injury and is now at decreased risk for pressure injury as opposed to initially beginning PT sessions.   She is increasing in B LE strength as evidenced by the ability to negotiate steps with less assist and increased ease as well as improved transfers and increased amb tolerance. She is now able to amb 7 minutes without a rest break, per her daughters.   She has not had any falls.       Continued need for the following skills: Safe transfers in her home, stability and

## 2024-03-26 ENCOUNTER — HOME CARE VISIT (OUTPATIENT)
Age: 89
End: 2024-03-26
Payer: MEDICARE

## 2024-03-26 VITALS
OXYGEN SATURATION: 99 % | HEART RATE: 97 BPM | SYSTOLIC BLOOD PRESSURE: 125 MMHG | TEMPERATURE: 98.2 F | DIASTOLIC BLOOD PRESSURE: 60 MMHG | RESPIRATION RATE: 16 BRPM

## 2024-03-26 PROCEDURE — G0157 HHC PT ASSISTANT EA 15: HCPCS

## 2024-03-26 NOTE — HOME HEALTH
Patient's Subjective: She is not feeling good today. Her R knee hurts a lot today. She does not want to move it.     HEP: Yes  Insurance Changes: no  Falls since last session:  no  Trips to ER since last session?  no  Pain/Discomfort ?  See pain page.   New Meds: no  Upcoming MD appointments: F/U with DR Ureña in ~ 6 months.    Caregiver involvement/assistance needed for: The patient's caregiver assists with meals, meds,  transportation, chores  She requires writing down her instructions for her due to hearing loss.     .  Home health supplies by type and quantity ordered/delivered this visit include: no  .  Objective:  See interventions.    Patient response to treatment: Pt was unable to go to her home today due to increased pain.     Patient and her daughter's level of understanding of education provided:  They were very receptive to the changes needed in her home to increase her safety and ability to return home in the daytime.     Assess of progress towards goals:   Pt unable to exercise today due to pain and not feeling well.   She and her family are receptive to make changes and to work towards being home at least in the day.   She has been increasing her time in amb and has not had any new falls. She is progressing towards more Ind.     Continued need for the following skills: Safe transfers in her home, stability and strengthening to decrease her fall risk. She plans to try to stay home in the near future alone and then return to her daughter's home in the evenings.      Plan for next visit: Assess ability to get out of her daughters home and in to her across the street. Assess transfers in her home.      The following discharge was discussed with the patient/caregiver :  Estimated DC date:3/21/2024 or when goals are met, by Ian Mejias, PT Pt's family report understanding.     NOMNC required? Yes

## 2024-03-28 ENCOUNTER — HOME CARE VISIT (OUTPATIENT)
Age: 89
End: 2024-03-28
Payer: MEDICARE

## 2024-03-28 VITALS
SYSTOLIC BLOOD PRESSURE: 110 MMHG | TEMPERATURE: 97.8 F | DIASTOLIC BLOOD PRESSURE: 70 MMHG | OXYGEN SATURATION: 98 % | RESPIRATION RATE: 16 BRPM | HEART RATE: 84 BPM

## 2024-03-28 PROCEDURE — G0157 HHC PT ASSISTANT EA 15: HCPCS

## 2024-03-28 NOTE — HOME HEALTH
Patient's Subjective: Per the patient's daughter, she is able to get her own coffee now, but when she was standing at the counter today, her knee buckled and she had to catch her to keep her from falling.   During a.m. care today, she amb to the bathroom and was standing to do her sponge bath and her knee buckled again.     HEP:Yes,   Insurance Changes: no  Falls since last session:  no   Trips to ER since last session? no  Pain/Discomfort ?  See pain page.   New Meds: no  Upcoming MD appointments: F/U with DR Ureña in ~ 6 months.     Caregiver involvement/assistance needed for: The patient's caregiver assists with meals, meds,  transportation, chores    She requires writing down her instructions for her due to hearing loss.     .  Home health supplies by type and quantity ordered/delivered this visit include:  none  .  Objective:  See interventions.    Patient response to treatment:  Poor due to anxiety about her R knee.     Patient level of understanding of education provided:  Re education for safe transfers from her recliner and gait with poor return demonstration.     Assess of progress towards goals:   Min progress today due to R knee issues.       Continued need for the following skills: Safe transfers in her home, stability and strengthening to decrease her fall risk. She plans to try to stay home in the near future alone and then return to her daughter's home in the evenings.  Unable to try today due to rain as well as R knee buckling and pt with anxiety over it.    Plan for next visit: Assess ability to get out of her daughters home and in to her across the street. Assess transfers in her home.       The following discharge was discussed with the patient/caregiver :  Estimated DC date:3/21/2024 or when goals are met, by Ian Mejias F, PT Pt's family report understanding.     NOMNC required? Yes

## 2024-04-01 ENCOUNTER — HOME CARE VISIT (OUTPATIENT)
Age: 89
End: 2024-04-01
Payer: MEDICARE

## 2024-04-01 VITALS
DIASTOLIC BLOOD PRESSURE: 80 MMHG | RESPIRATION RATE: 16 BRPM | SYSTOLIC BLOOD PRESSURE: 130 MMHG | OXYGEN SATURATION: 99 % | TEMPERATURE: 99 F

## 2024-04-01 PROCEDURE — G0157 HHC PT ASSISTANT EA 15: HCPCS

## 2024-04-01 ASSESSMENT — ENCOUNTER SYMPTOMS: PAIN LOCATION - PAIN QUALITY: LOCKING UP

## 2024-04-01 NOTE — HOME HEALTH
Patient's Subjective: She hurts when she amb. He feels like the knee locks up and she cant move it. It is a 10/10.   Per her Daughter, over the weekend, her feet were swelling. She did have her elevate them. They are really dry.     HEP: Yes, a little bit.   Insurance Changes: no  Falls since last session: no  Trips to ER since last session? no  Pain/Discomfort ?  See pain page.   New Meds:   Upcoming MD appointments:   Dr Purcell 4/8 at 10:40 a.m.   F/U with DR Ureña in ~ 6 months.      Caregiver involvement/assistance needed for: The patient's caregiver assists with meals, meds,  transportation, chores  .  Home health supplies by type and quantity ordered/delivered this visit include: none  .  Objective:  See interventions.    Patient response to treatment:  Poor due to fear of knee locking up.     Patient level of understanding of education provided:  - Increase frequency of moisturizing B dry legs and keep in between the toes dry. Recommended a thicker cream vs moisturizer, due to B legs being very dry.   - Exercises for stability test wit pt able to return demonstrate most. See Tinetti balance Assessment for specifics.   -Safe and correct sit/stand transfer at her recliner.     Assess of progress towards goals:   Per her daughter, she is moving every 2 hours for pressure relief. She is also very good at shifting her weight side to side. he is progressing towards her pressure injury goal.   No skin break down, per the patient's daughter, who reports that she assesses her body daily.     Continued need for the following skills: Safe transfers in her home, stability and strengthening to decrease her fall risk. She plans to try to stay home in the near future alone and then return to her daughter's home in the evenings.  Unable to try today due to rain as well as R knee buckling and pt with anxiety over it.    Tinetti Balance Assessment:  20/28 no change since last assessed. Her goal was 19/28. So, she has improved

## 2024-04-03 ENCOUNTER — HOME CARE VISIT (OUTPATIENT)
Age: 89
End: 2024-04-03
Payer: MEDICARE

## 2024-04-03 VITALS
SYSTOLIC BLOOD PRESSURE: 130 MMHG | HEART RATE: 79 BPM | TEMPERATURE: 98 F | DIASTOLIC BLOOD PRESSURE: 80 MMHG | OXYGEN SATURATION: 98 %

## 2024-04-03 PROCEDURE — G0157 HHC PT ASSISTANT EA 15: HCPCS

## 2024-04-03 NOTE — HOME HEALTH
Patient's Subjective: She still hurts a lot in the knee with any bending and straightening and walking. It has gotten worse.     HEP: Yes, a little bit. Some amb but not a lot due to pain in knee.   Insurance Changes: no  Falls since last session: no  Trips to ER since last session? no  Pain/Discomfort ?  See pain page.   New Meds: no  Upcoming MD appointments:   Dr Da Silva 4/8 at 10:40 a.m.   F/U with DR Ureña in ~ 6 months.       Caregiver involvement/assistance needed for: The patient's caregiver assists with meals, meds,  transportation, chores.    Home health supplies by type and quantity ordered/delivered this visit include: none    Objective:  See interventions.    Patient response to treatment: Poor w ith c/o 10/10 pain and fatigue by the time she sat to rest. She did report fast recovery.    Patient level of understanding of education provided:  Education for her daughter to always check the suction on the rails in shower first, as they sometimes come off and she can fall. She reported understanding.    Assess of progress towards goals: Pt with increased pain and fatigue recently. Now that her pain has returned in the R knee with it locking up and/or buckling, she has demonstrated decreased activity tolerance.   She is seeing Dr Da Silva prior to her next scheduled PT appointment. She and her daughter plan to discuss decreasing pain through shots or meds.     Continued need for the following skills: Safe transfers in her home, stability and strengthening to decrease her fall risk. She plans to try to stay home in the near future alone and then return to her daughter's home in the evenings.  Unable to try today due to rain as well as R knee buckling and pt with anxiety over it.     Plan for next visit: Assess ability to get out of her daughters home and in to her across the street, if her pain decreases with amb and knee stops locking stop. She is not able to see Dr Da Silva until 4/8.    The following

## 2024-04-10 ENCOUNTER — HOME CARE VISIT (OUTPATIENT)
Age: 89
End: 2024-04-10
Payer: MEDICARE

## 2024-04-10 VITALS
DIASTOLIC BLOOD PRESSURE: 80 MMHG | RESPIRATION RATE: 16 BRPM | HEART RATE: 96 BPM | OXYGEN SATURATION: 96 % | SYSTOLIC BLOOD PRESSURE: 140 MMHG | TEMPERATURE: 98.9 F

## 2024-04-10 PROCEDURE — G0157 HHC PT ASSISTANT EA 15: HCPCS

## 2024-04-10 NOTE — HOME HEALTH
Patient's Subjective: She could not go to the doctor due to her cough being bad. She still has catches in her R knee limiting her.     HEP: She is doing seated leg exercises and walking some.   Insurance Changes: no  Falls since last session:    Trips to ER since last session?  no  Pain/Discomfort ?  See pain page.   New Meds: no  MD appointments:  F/U with DR Ureña in ~ 6 months.       Assessment and Summary of Care:  Patient's current functional status before discharge is as follows  Strength: Will require reassessed at DC visit.  ROM:  WFL\"s   Bed Mobility:  Sleeps in the   Transfers:   Sit/stand: Recliner:  SBA  Shower: Mod A/ Max A to transfer in/out of the tub. This is due to being anxious vs overall strength. She is easily overwhelmed.   Toilet: Mod A   Car: Prior level - SBA on floor of vehicle to rest. She has not performed car transfer since 3/20. It was Min A/Mod A. She has not performed in > 2 weeks now, due to fear of knee buckle.   Bed: Pt refuses to get in the bed. She hasn't slept in bd since 1995 when her  passed away. She sleeps in her recliner.  Gait: At best before her R knee began to buckle she could tolerate 7 minutes of uninterrupted amb. Now, due to her R knee sx she amb 122 feet today with increased respirations that lasted about 2 minutes.   Stairs: NT recently due to pain. Previous visits,  could negotiate 3 steps with folded walker and Mod A with use of gait belt.  Special Tests:  Tinetti Balance Assessment: 6/28 at eval 20/28 last assessed prior to knee buckle and sx.   Recommendations: Recommended she cont with activity to tolerance. When/if she gets shots in her knees and feels better, she can request PT again. If ins allows, we can come back.     The following discharge was discussed with the patient/caregiver :  Newest DC date: 4/11/2024 by Ian Mejias, PT   NOMNC required? Yes

## 2024-04-11 ENCOUNTER — HOME CARE VISIT (OUTPATIENT)
Age: 89
End: 2024-04-11
Payer: MEDICARE

## 2024-04-11 ENCOUNTER — HOSPITAL ENCOUNTER (OUTPATIENT)
Facility: HOSPITAL | Age: 89
Setting detail: SPECIMEN
Discharge: HOME | End: 2024-04-11
Payer: MEDICARE

## 2024-04-11 LAB
ALBUMIN SERPL-MCNC: 3.4 G/DL (ref 3.4–5)
ALBUMIN/GLOB SERPL: 0.9 (ref 0.8–1.7)
ALP SERPL-CCNC: 84 U/L (ref 45–117)
ALT SERPL-CCNC: 19 U/L (ref 13–56)
ANION GAP SERPL CALC-SCNC: 6 MMOL/L (ref 3–18)
APPEARANCE UR: CLEAR
AST SERPL-CCNC: 23 U/L (ref 10–38)
BACTERIA URNS QL MICRO: ABNORMAL /HPF
BASOPHILS # BLD: 0 K/UL (ref 0–0.1)
BASOPHILS NFR BLD: 0 % (ref 0–2)
BILIRUB SERPL-MCNC: 0.7 MG/DL (ref 0.2–1)
BILIRUB UR QL: NEGATIVE
BUN SERPL-MCNC: 9 MG/DL (ref 7–18)
BUN/CREAT SERPL: 15 (ref 12–20)
CALCIUM SERPL-MCNC: 8.9 MG/DL (ref 8.5–10.1)
CHLORIDE SERPL-SCNC: 102 MMOL/L (ref 100–111)
CO2 SERPL-SCNC: 28 MMOL/L (ref 21–32)
COLOR UR: YELLOW
CREAT SERPL-MCNC: 0.61 MG/DL (ref 0.6–1.3)
DIFFERENTIAL METHOD BLD: NORMAL
EOSINOPHIL # BLD: 0.1 K/UL (ref 0–0.4)
EOSINOPHIL NFR BLD: 1 % (ref 0–5)
EPITH CASTS URNS QL MICRO: ABNORMAL /LPF (ref 0–5)
ERYTHROCYTE [DISTWIDTH] IN BLOOD BY AUTOMATED COUNT: 12.9 % (ref 11.6–14.5)
GLOBULIN SER CALC-MCNC: 3.6 G/DL (ref 2–4)
GLUCOSE SERPL-MCNC: 87 MG/DL (ref 74–99)
GLUCOSE UR STRIP.AUTO-MCNC: NEGATIVE MG/DL
HCT VFR BLD AUTO: 41.3 % (ref 35–45)
HGB BLD-MCNC: 13.9 G/DL (ref 12–16)
HGB UR QL STRIP: ABNORMAL
IMM GRANULOCYTES # BLD AUTO: 0 K/UL (ref 0–0.04)
IMM GRANULOCYTES NFR BLD AUTO: 0 % (ref 0–0.5)
KETONES UR QL STRIP.AUTO: NEGATIVE MG/DL
LEUKOCYTE ESTERASE UR QL STRIP.AUTO: ABNORMAL
LYMPHOCYTES # BLD: 1.7 K/UL (ref 0.9–3.6)
LYMPHOCYTES NFR BLD: 21 % (ref 21–52)
MAGNESIUM SERPL-MCNC: 1.9 MG/DL (ref 1.6–2.6)
MCH RBC QN AUTO: 31.9 PG (ref 24–34)
MCHC RBC AUTO-ENTMCNC: 33.7 G/DL (ref 31–37)
MCV RBC AUTO: 94.7 FL (ref 78–100)
MONOCYTES # BLD: 0.4 K/UL (ref 0.05–1.2)
MONOCYTES NFR BLD: 6 % (ref 3–10)
NEUTS SEG # BLD: 5.7 K/UL (ref 1.8–8)
NEUTS SEG NFR BLD: 72 % (ref 40–73)
NITRITE UR QL STRIP.AUTO: NEGATIVE
NRBC # BLD: 0 K/UL (ref 0–0.01)
NRBC BLD-RTO: 0 PER 100 WBC
PH UR STRIP: 7.5 (ref 5–8)
PLATELET # BLD AUTO: 196 K/UL (ref 135–420)
PMV BLD AUTO: 9.9 FL (ref 9.2–11.8)
POTASSIUM SERPL-SCNC: 3.3 MMOL/L (ref 3.5–5.5)
PROT SERPL-MCNC: 7 G/DL (ref 6.4–8.2)
PROT UR STRIP-MCNC: NEGATIVE MG/DL
RBC # BLD AUTO: 4.36 M/UL (ref 4.2–5.3)
RBC #/AREA URNS HPF: ABNORMAL /HPF (ref 0–5)
SODIUM SERPL-SCNC: 136 MMOL/L (ref 136–145)
SP GR UR REFRACTOMETRY: 1.01 (ref 1–1.03)
UROBILINOGEN UR QL STRIP.AUTO: 0.2 EU/DL (ref 0.2–1)
WBC # BLD AUTO: 7.9 K/UL (ref 4.6–13.2)
WBC URNS QL MICRO: ABNORMAL /HPF (ref 0–4)
YEAST URNS QL MICRO: ABNORMAL

## 2024-04-11 PROCEDURE — 87088 URINE BACTERIA CULTURE: CPT

## 2024-04-11 PROCEDURE — 85025 COMPLETE CBC W/AUTO DIFF WBC: CPT

## 2024-04-11 PROCEDURE — 87086 URINE CULTURE/COLONY COUNT: CPT

## 2024-04-11 PROCEDURE — 87186 SC STD MICRODIL/AGAR DIL: CPT

## 2024-04-11 PROCEDURE — 83735 ASSAY OF MAGNESIUM: CPT

## 2024-04-11 PROCEDURE — G0300 HHS/HOSPICE OF LPN EA 15 MIN: HCPCS

## 2024-04-11 PROCEDURE — 81001 URINALYSIS AUTO W/SCOPE: CPT

## 2024-04-11 PROCEDURE — 80053 COMPREHEN METABOLIC PANEL: CPT

## 2024-04-12 ENCOUNTER — HOME CARE VISIT (OUTPATIENT)
Age: 89
End: 2024-04-12
Payer: MEDICARE

## 2024-04-12 VITALS
OXYGEN SATURATION: 95 % | HEART RATE: 85 BPM | DIASTOLIC BLOOD PRESSURE: 80 MMHG | RESPIRATION RATE: 16 BRPM | TEMPERATURE: 97.2 F | SYSTOLIC BLOOD PRESSURE: 142 MMHG

## 2024-04-12 ASSESSMENT — ENCOUNTER SYMPTOMS
COUGH: 1
COUGH CHARACTERISTICS: PRODUCTIVE

## 2024-04-12 NOTE — HOME HEALTH
Skilled reason for visit: Lab speciemen collection    Caregiver involvement: Caregivers in home during visit and verbalize understanding of lab collection    Medications reviewed and all medications are available in the home this visit.    The following education was provided regarding medications:  Medications and side effects  MD notified of any discrepancies/look a-like medications/medication interactions: N/A  Medications are effective at this time.      Home health supplies by type and quantity ordered/delivered this visit include: N/A    Patient education provided this visit: Patient educated on speciemen collection    Sharps education provided: N/A    Patient level of understanding of education provided: Patient's caregiver verbalized understanding    Patient response to procedure performed:  N/A    Agency Progress toward goals: N/A    Patient's Progress towards personal goals: Patient's family monitoring for s/s of infection    Home exercise program: N/A    Continued need for the following skills: N/A    Plan for next visit: N/A    Patient and/or caregiver notified and agrees to changes in the Plan of Care: yes     The following discharge planning was discussed with the pt/caregiver:N/A

## 2024-04-12 NOTE — HOME HEALTH
During this visit, patient presents with the following clinical findings:    SUBJECTIVE: Patient denies fall since evaluation.  Patient reported he/she is now able to ***.  Patient c/o ***intermittent *** throbbing, sore, nagging, dull achy pain on *** with highest pain level of ***/10 and least pain level of ***/10. Patient manages pain by taking  pain medication as prescribed by MD, application of cold pack for soreness and achy  type  of pain /*** hot  packs for achy, stiffness type of pain on painful area x 20 mins, positioning and relaxation.   .  CAREGIVER ASSISTANCE: ***  .  MEDICATIONS RECONCILED AND UPDATED: no changes in medications at this time  .  MMT: R hip flex ***/5   R hip abd ***/5   R hip add ***/5   R knee flex ***/5  R knee ext ***/5    R ankle DF ***/5  L hip flex ***/5  L hip abd ***/5  L hip add ***/5  L knee flex ***/5  L knee ext ***/5  L ankle  DF ***/5    FTSTS: ***   compared to *** during initial evaluation.  .  ROM: noted *** degrees on *** with c/o *** at end range of *** compared to *** during initial evaluation.  .  BALANCE: Patient is ***  risk of falls evidenced by  ***/28 on Tinetti balance and gait test and completed TUG test in *** seconds using *** compared to *** and *** respectively during initial evaluation.  .  2-MINUTE WALK TEST:  *** ft with  RPE of *** and  O2 sat ranges from *** compared to *** ft during initial evaluation.  .  BED MOBILITY: Patient requires *** assistance / is now indep in bed mobility with *** cues from *** during initial evaluation.  .  TRANSFERS: Patient requires *** assistance / is now indep in transfers to and from bed, chair, toilet, and car using *** with *** cues from ***during initial evaluation.  .  GAIT: Patient is now able to ambulate *** indep *** on even a nd uneven surfaces using *** with forward lean, decreased *** hip/knee flexion during swing phase, decreased stance phase on ***, unequal step length, decreased amairani, wide LONI

## 2024-04-14 LAB
BACTERIA SPEC CULT: ABNORMAL
BACTERIA SPEC CULT: ABNORMAL
CC UR VC: ABNORMAL
SERVICE CMNT-IMP: ABNORMAL

## 2024-04-20 VITALS
OXYGEN SATURATION: 97 % | SYSTOLIC BLOOD PRESSURE: 124 MMHG | DIASTOLIC BLOOD PRESSURE: 82 MMHG | RESPIRATION RATE: 14 BRPM | TEMPERATURE: 96 F | HEART RATE: 80 BPM

## 2024-04-20 ASSESSMENT — ENCOUNTER SYMPTOMS: DYSPNEA ACTIVITY LEVEL: AFTER AMBULATING MORE THAN 20 FT

## 2024-05-16 ENCOUNTER — HOME HEALTH ADMISSION (OUTPATIENT)
Age: 89
End: 2024-05-16
Payer: MEDICARE

## 2024-05-18 ENCOUNTER — HOME CARE VISIT (OUTPATIENT)
Age: 89
End: 2024-05-18

## 2024-05-19 ENCOUNTER — HOME CARE VISIT (OUTPATIENT)
Age: 89
End: 2024-05-19

## 2024-05-19 VITALS
HEART RATE: 77 BPM | SYSTOLIC BLOOD PRESSURE: 120 MMHG | OXYGEN SATURATION: 98 % | TEMPERATURE: 98.2 F | DIASTOLIC BLOOD PRESSURE: 80 MMHG | RESPIRATION RATE: 18 BRPM

## 2024-05-19 PROCEDURE — 0221000100 HH NO PAY CLAIM PROCEDURE

## 2024-05-19 PROCEDURE — G0151 HHCP-SERV OF PT,EA 15 MIN: HCPCS

## 2024-05-19 NOTE — HOME HEALTH
Skilled services/Home bound verification:     Skilled Reason for admission/summary of clinical condition:  primary dx of osteoporosis  This patient is homebound for the following reasons Requires considerable and taxing effort to leave the home .    Caregiver: relative.  Caregiver assists with IADL's.    Medications reconciled and all medications are available in the home this visit.        Roshni Archer MDnotified of any discrepancies/look a like medications/medication interactions n/a.     Home health supplies by type and quantity ordered/delivered this visit include: none      Pt/Caregiver instructed on plan of care and are agreeable to plan of care at this time.      Physician Roshni Archer MD notified of patient admission to home health and plan of care including anticipated frequency of 3w1 2w3 for PT     Discharge planning discussed with patient and caregiver.  Discharge planning as follows: dc when all goals are met.  Pt/Caregiver did verbalize understanding of discharge planning.         PMHx: palpitations, HTN, RBBB, sinus arrhythmia, syncope and collapse, Afib with RVR, hyponatremia, hearing loss, hx of stroke, cystitis, advanced age      SUBJECTIVE: weakness on BLE, difficulty with walking and stair management   LIVING SITUATION/PLOF: Lives with daughter in a 1 level house with 3 steps to exit/enter house without railing, daughter assists with ADL's and IADL's and has been using RW for gait  CAREGIVER INVOLVEMENT/ ASSISTANCE NEEDED FOR: daughter Pamela for transportation, meal prep, S with mobility/ADL's  MEDICATIONS REVIEWED AND UPDATED:no severe interaction noted   NEXT MD APPOINTMENT 9/18/24  Dr. Ureña     EQUIIPMENT: RW, shower chair, grab bar, BSC, manual wheelchair, hospital bed   ROM: BLE wfl  STRENGTH: B hip flexor, extensor, hip abductors, hip adductors 3+/5, B knee flexor and extensor 3-/5  FTSTS 66 seconds  TUG 53 seconds   WOUNDS: none   BED MOBILITY: sleeps on a recliner and needed

## 2024-05-22 ENCOUNTER — HOME CARE VISIT (OUTPATIENT)
Age: 89
End: 2024-05-22

## 2024-05-22 PROCEDURE — G0157 HHC PT ASSISTANT EA 15: HCPCS

## 2024-05-24 ENCOUNTER — HOME CARE VISIT (OUTPATIENT)
Age: 89
End: 2024-05-24

## 2024-05-24 PROCEDURE — G0157 HHC PT ASSISTANT EA 15: HCPCS

## 2024-05-27 ASSESSMENT — ENCOUNTER SYMPTOMS: PAIN LOCATION - PAIN QUALITY: ACHE

## 2024-05-28 ENCOUNTER — HOME CARE VISIT (OUTPATIENT)
Age: 89
End: 2024-05-28

## 2024-05-28 PROCEDURE — G0157 HHC PT ASSISTANT EA 15: HCPCS

## 2024-05-28 NOTE — HOME HEALTH
SUBJECTIVE:  Pt reports she slept well after last PT session but has been doing her HEP as directed.  CAREGIVER INVOLVEMENT/ASSISTANCE NEEDED FOR: Pts dtrs assist pt with all needs prn but not present during PT session.  .  OBJECTIVE:  See interventions.  PATIENT EDUCATION PROVIDED THIS VISIT: Pt instructed to continue HEP 2x/day and amb with FWW in home as tolerated.  Pt also instructed to pace herself with all activities and focus on proper breathing techniques for energy conservation.  PATIENT RESPONSE TO EDUCATION PROVIDED: Pt reports she has been doing her HEP in sitting 2x/day and walking to/from bathroom with FWW and assist throughout day.  PATIENT RESPONSE TO TREATMENT: Pt requires occ rest breaks secondary to c/o fatigue with SOB.  Vitals remain WFL.  .  ASSESSMENT OF PROGRESS TOWARD GOALS: Pt reports good teach back method with sitting HEP.  Pt continues to require cuing to pace herself and focus on proper breathing techniques with all activities for safety and energy conservation.  .  PLAN FOR NEXT VISIT: Will plan to continue progressing standing exercises as tolerated.  THE FOLLOWING DISCHARGE PLANNING WAS DISCUSSED WITH THE PATIENT/CAREGIVER: Pt is scheduled to continue PT 2x/wk next week.

## 2024-05-29 NOTE — HOME HEALTH
SUBJECTIVE:Pt reports she is doing well this AM but her R leg gets tired easily.  CAREGIVER INVOLVEMENT/ASSISTANCE NEEDED FOR: pts dtrs assist pt with all needs prn and pts dtr present during PT session.  .  OBJECTIVE:  See interventions.  PATIENT EDUCATION PROVIDED THIS VISIT: Pt instructed to continue HEP 2x/day and amb with FWW as tolerated with assist.   PATIENT RESPONSE TO EDUCATION PROVIDED: Pt reports she has been doing her HEP and walks with assist using walker to/from bathroom/bedroom.  PATIENT RESPONSE TO TREATMENT: Pt requires frequent rest breaks throughout PT session secondary to c/o fatigue. Vitals remain WFL.  .  ASSESSMENT OF PROGRESS TOWARD GOALS: Pt continues to require frequent rest breaks throughout PT session secondary to c/o fatigue and soreness in R lateral knee/calf during amb.  Pt was able to tolerate standing exercises today with seated rest breaks between each exercises secondary to c/o SOB.    .  PLAN FOR NEXT VISIT: Will plan to progress standing exercises to improve LE strength/gait balance.  THE FOLLOWING DISCHARGE PLANNING WAS DISCUSSED WITH THE PATIENT/CAREGIVER: Pt is scheduled to continue PT 1 more visit then 2w2.

## 2024-05-30 ENCOUNTER — HOME CARE VISIT (OUTPATIENT)
Age: 89
End: 2024-05-30

## 2024-05-30 VITALS
DIASTOLIC BLOOD PRESSURE: 78 MMHG | RESPIRATION RATE: 16 BRPM | TEMPERATURE: 97.8 F | OXYGEN SATURATION: 98 % | SYSTOLIC BLOOD PRESSURE: 138 MMHG | HEART RATE: 93 BPM

## 2024-05-30 PROCEDURE — G0157 HHC PT ASSISTANT EA 15: HCPCS

## 2024-05-30 ASSESSMENT — ENCOUNTER SYMPTOMS
PAIN LOCATION - PAIN QUALITY: ACHE
PAIN LOCATION - PAIN QUALITY: ACHE

## 2024-05-30 NOTE — HOME HEALTH
Subjective: Pt states \"My knees hurt when I'm standing, but it goes away when I sit down.\"    Caregiver involvement: Pt's family assist with household tasks, meal prep and care as needed.    Medications reviewed and all medications are available in the home this visit.    Medications are effective at this time.  no new medication    Patient education provided this visit: fall prevention, body mechanics with mobility, breathing with activity    Patient level of understanding of education provided: Pt requires further education.    Patient response to procedure performed:  Pt fatigued at end of session.    Patient's Progress towards personal goals: Pt reports no falls and no ER visits.  Standing balance has improved as evidenced by TUG improved from 53 sec at start of care to 42 sec today with rolling walker and B UE support for transition. LE strength has improved as evidenced by FTSTS improved from 66 sec at start of care to 44 sec today with B UE support for transition. Pt performed LE exercises in standing and required tactile and visual cues for correct body mechanics and breathing t/o activity.  She fatigues quickly with standing activity and requires frequent rest breaks.    Continued need for the following skills: HHPT medically necessary to address decreased LE strength, decreased standing balance and increased risk of falls.    The following discharge planning was discussed with the pt/caregiver: d/c HHPT in 4 more visits.

## 2024-06-03 ENCOUNTER — HOME CARE VISIT (OUTPATIENT)
Age: 89
End: 2024-06-03
Payer: MEDICARE

## 2024-06-03 PROCEDURE — G0157 HHC PT ASSISTANT EA 15: HCPCS

## 2024-06-04 ASSESSMENT — ENCOUNTER SYMPTOMS: PAIN LOCATION - PAIN QUALITY: ACHE

## 2024-06-05 NOTE — HOME HEALTH
SUBJECTIVE: Pt reports she is doing better and her R knee is feeling better with amb since she has been wearing a R knee sleeve.  CAREGIVER INVOLVEMENT/ASSISTANCE NEEDED FOR: pts dtrs assist pt with all needs prn and present during PT session.  .  OBJECTIVE:  See interventions.  PATIENT EDUCATION PROVIDED THIS VISIT: Pt instructed to continue HEP 2x/day and amb with FWW in home every 1-2 waking hours.  Added standing exercises to HEP.  PATIENT RESPONSE TO EDUCATION PROVIDED: Pt reports she will plan to continue doing her HEP and amb as tolerated with FWW  PATIENT RESPONSE TO TREATMENT: Pt performed standing and sitting exercises today with seated rest breaks between every 2 exercises.    .  ASSESSMENT OF PROGRESS TOWARD GOALS: Reviewed with pts dtrs amb up/down steps with folded walker.  Pt c/o fatigue today and stair amb attempts deferred until next week. Pt demonstrates a fair understanding of HEP requiring cuing for proper posture and to pace herself.  Pts dtrs demonstrate a good technique/cuing in assist pt with exercises.  .  PLAN FOR NEXT VISIT: Will plan to attempt stair amb next visit.  THE FOLLOWING DISCHARGE PLANNING WAS DISCUSSED WITH THE PATIENT/CAREGIVER: Pt is scheduled to continue PT 1 more visit this week then 2w1 with dc/reassessment next week.

## 2024-06-06 ENCOUNTER — HOME CARE VISIT (OUTPATIENT)
Age: 89
End: 2024-06-06
Payer: MEDICARE

## 2024-06-06 PROCEDURE — G0157 HHC PT ASSISTANT EA 15: HCPCS

## 2024-06-11 ENCOUNTER — HOME CARE VISIT (OUTPATIENT)
Age: 89
End: 2024-06-11
Payer: MEDICARE

## 2024-06-11 PROCEDURE — G0157 HHC PT ASSISTANT EA 15: HCPCS

## 2024-06-11 NOTE — HOME HEALTH
SUBJECTIVE: Pt reports she is doing okay today but is very fearful of the steps.  Pt reports she refuses to sit in wc at all.  CAREGIVER INVOLVEMENT/ASSISTANCE NEEDED FOR: Pts dtrs assist pt with all needs prn and present during PT session.  .  OBJECTIVE:  See interventions.  PATIENT EDUCATION PROVIDED THIS VISIT: Pts dtr to have railing installed for stair amb. Pt may request PT to return for stair training once pts dtr has stair railing installed.  Pt  to continue HEP 2x/day , amb as tolerated with FWW in home and S for safety.  PATIENT RESPONSE TO EDUCATION PROVIDED: Pt reports she will continue her HEP daily and walking in home as tolerated.  PATIENT RESPONSE TO TREATMENT: Pt attempted to amb on porch but then refused stair amb secondary to feeling anxious/scared per pt.  Pt was hugging the door amb requires much encouragement/extra time to place hands on walker when on porch to return to inside home.  .  ASSESSMENT OF PROGRESS TOWARD GOALS:  Bed mobility:pt sleeps in recliner and adamantly declined to attempted bed mobility in hospital bed.  Transfers: Pt is S with UE support with all transfers including cuing for safe positioning/hand placement occasionally which demonstrates an improvement from initial evaluation of min assist.  This allows the pt increased functional independence and mobilty.  Gait/wc mobility:Pt is able to amb 100 feet in home with S assist with FWW AD.  This represents an improvement from initial evaluation of amb  with FWW AD on level surfaces with mod assist.  Stairs:Attempted stair amb multiple times and pt appears very anxious and declined attempts secondary to fear of falling.  Pt adamantly refuses to use her portable ramp and w/c with pts dtr assist.  Pts dtrs reports they do not feel strong enough to assist pt amb up/down ramp with FWW.  Pts dtr purchased railing for back porch but unsure when her nephew is able to install railing.  Strength: Pt has improved TUG from 53 seconds

## 2024-06-12 ENCOUNTER — HOME CARE VISIT (OUTPATIENT)
Age: 89
End: 2024-06-12
Payer: MEDICARE

## 2024-06-14 VITALS
HEART RATE: 72 BPM | RESPIRATION RATE: 17 BRPM | TEMPERATURE: 98.2 F | OXYGEN SATURATION: 98 % | DIASTOLIC BLOOD PRESSURE: 72 MMHG | SYSTOLIC BLOOD PRESSURE: 124 MMHG

## 2024-06-14 ASSESSMENT — ENCOUNTER SYMPTOMS: PAIN LOCATION - PAIN QUALITY: ACHE

## 2024-06-15 ENCOUNTER — HOME CARE VISIT (OUTPATIENT)
Age: 89
End: 2024-06-15
Payer: MEDICARE

## 2024-06-15 VITALS
TEMPERATURE: 97.8 F | DIASTOLIC BLOOD PRESSURE: 80 MMHG | HEART RATE: 78 BPM | RESPIRATION RATE: 18 BRPM | SYSTOLIC BLOOD PRESSURE: 110 MMHG | OXYGEN SATURATION: 98 %

## 2024-06-15 PROCEDURE — G0151 HHCP-SERV OF PT,EA 15 MIN: HCPCS

## 2024-06-15 NOTE — HOME HEALTH
SUBJECTIVE pleased with progress achieved   CAREGIVER ASSISTANCE:daughter will be able to assist as needed   MEDICATIONS REVIEWED AND UPDATED: no changes in medications at this time  WOUND none   ROM wfl  BED MOBILITY supervision   TRANSFERS supervision with RW  GAIT TRAINING supervision with RW   STAIRS n/a  THERAPEUTIC EXERCISES independent   BALANCE Tinetti 19/28  PATIENT/CAREGIVER EDUCATION THIS VISIT: patient provided with skilled PT intervention consisting of graded therapeutic exercises, gait training, balance training, safe transfers training, caregiver education and Home exercise program education. Patient at time of discharge was able to demonstrate supervision with bed mobility, transfers and gait with RW inside and outside the house. pt. also noted that she is doing HEP 1x a day.  PLAN DC at this time due to goals are met  DISCHARGE PLANNING DISCUSSED: dc to self and family under MD supervision

## 2024-11-18 NOTE — PROGRESS NOTES
Eda Quinn presents today for No chief complaint on file.      Eda Quinn preferred language for health care discussion is english/other.    Is someone accompanying this pt? no    Is the patient using any DME equipment during OV? no    Depression Screening:  Depression: Not at risk (3/20/2024)    PHQ-2     PHQ-2 Score: 0        Learning Assessment:  Who is the primary learner? Patient    What is the preferred language for health care of the primary learner? ENGLISH    How does the primary learner prefer to learn new concepts? DEMONSTRATION    Answered By patient    Relationship to Learner SELF           Pt currently taking Anticoagulant therapy? no    Pt currently taking Antiplatelet therapy ? aspirin      Coordination of Care:  1. Have you been to the ER, urgent care clinic since your last visit? Hospitalized since your last visit? no    2. Have you seen or consulted any other health care providers outside of the [facility] since your last visit? Include any pap smears or colon screening. no

## 2024-11-19 ENCOUNTER — OFFICE VISIT (OUTPATIENT)
Age: 89
End: 2024-11-19

## 2024-11-19 VITALS
HEIGHT: 67 IN | HEART RATE: 113 BPM | OXYGEN SATURATION: 96 % | SYSTOLIC BLOOD PRESSURE: 124 MMHG | WEIGHT: 142 LBS | BODY MASS INDEX: 22.29 KG/M2 | DIASTOLIC BLOOD PRESSURE: 76 MMHG

## 2024-11-19 DIAGNOSIS — R55 SYNCOPE AND COLLAPSE: ICD-10-CM

## 2024-11-19 DIAGNOSIS — R00.2 PALPITATIONS: Primary | ICD-10-CM

## 2024-11-19 DIAGNOSIS — I10 ESSENTIAL (PRIMARY) HYPERTENSION: ICD-10-CM

## 2024-11-19 DIAGNOSIS — I45.10 UNSPECIFIED RIGHT BUNDLE-BRANCH BLOCK: ICD-10-CM

## 2024-11-19 RX ORDER — ACETAMINOPHEN 650 MG/1
650 SUPPOSITORY RECTAL EVERY 4 HOURS PRN
COMMUNITY

## 2024-11-19 ASSESSMENT — PATIENT HEALTH QUESTIONNAIRE - PHQ9
2. FEELING DOWN, DEPRESSED OR HOPELESS: NOT AT ALL
SUM OF ALL RESPONSES TO PHQ QUESTIONS 1-9: 0
SUM OF ALL RESPONSES TO PHQ9 QUESTIONS 1 & 2: 0
SUM OF ALL RESPONSES TO PHQ QUESTIONS 1-9: 0
SUM OF ALL RESPONSES TO PHQ QUESTIONS 1-9: 0
1. LITTLE INTEREST OR PLEASURE IN DOING THINGS: NOT AT ALL
SUM OF ALL RESPONSES TO PHQ QUESTIONS 1-9: 0

## 2024-11-19 NOTE — PROGRESS NOTES
HISTORY OF PRESENT ILLNESS  Eda Quinn  89 y.o. female     Chief Complaint   Patient presents with    Follow-up     3-6 months       ASSESSMENT and PLAN    The primary encounter diagnosis was Palpitations. Diagnoses of Syncope and collapse, Unspecified right bundle-branch block, and Essential (primary) hypertension were also pertinent to this visit.    Ms. Eda Quinn has history of hypertension and palpitations. she has no complaints of or history of samuel syncope. She has irregular heartbeats. Her Holter monitor obtained in October of 2014 showed mainly sinus rhythm, with arrhythmia. There were no significant ventricular tachycardic events. There were PVC's and limited SVT's.  In February 2017, she presented to the emergency room with dizziness and syncope.  She was noted to be dehydrated.  Since that time, she has not had any further episodes.  For her orthostatic dizziness, I did advise her to be careful and take her time.  In January 2020, she presented to the emergency room with a syncopal episode.  Apparently, she had been struggling with fevers, chills, nausea and malaise for about a week and was subsequently diagnosed with UTI.  According to the daughter, patient was somewhat dehydrated and she subsequently passed out while coming home from PCP visit.  She was seen in the emergency room and was subsequently discharged home.  Her echocardiogram done in January 2021 showed normal LV function with EF 55-60%, moderate pulmonary hypertension with PA pressure 54 mmHg, mild MR.  She was hospitalized in February 2024 at [facility] when she presented with syncope.  She was noted to be in new onset atrial fibrillation with RVR.  Decision was made not to use chronic oral anticoagulation with her age, and unsteady gait.    Medication regimen reviewed and current cardiac regimen will be continued.  All new testing since the last office visit was independently reviewed by me.    CAD:    She has no

## 2025-08-20 ENCOUNTER — OFFICE VISIT (OUTPATIENT)
Age: 89
End: 2025-08-20
Payer: MEDICARE

## 2025-08-20 VITALS
SYSTOLIC BLOOD PRESSURE: 126 MMHG | BODY MASS INDEX: 23.54 KG/M2 | DIASTOLIC BLOOD PRESSURE: 72 MMHG | HEIGHT: 67 IN | WEIGHT: 150 LBS | OXYGEN SATURATION: 97 % | HEART RATE: 105 BPM

## 2025-08-20 DIAGNOSIS — R00.2 PALPITATIONS: Primary | ICD-10-CM

## 2025-08-20 DIAGNOSIS — I45.10 UNSPECIFIED RIGHT BUNDLE-BRANCH BLOCK: ICD-10-CM

## 2025-08-20 DIAGNOSIS — I10 ESSENTIAL (PRIMARY) HYPERTENSION: ICD-10-CM

## 2025-08-20 DIAGNOSIS — R55 SYNCOPE AND COLLAPSE: ICD-10-CM

## 2025-08-20 PROCEDURE — 1159F MED LIST DOCD IN RCRD: CPT | Performed by: INTERNAL MEDICINE

## 2025-08-20 PROCEDURE — 93000 ELECTROCARDIOGRAM COMPLETE: CPT | Performed by: INTERNAL MEDICINE

## 2025-08-20 PROCEDURE — 1036F TOBACCO NON-USER: CPT | Performed by: INTERNAL MEDICINE

## 2025-08-20 PROCEDURE — 99214 OFFICE O/P EST MOD 30 MIN: CPT | Performed by: INTERNAL MEDICINE

## 2025-08-20 PROCEDURE — 1126F AMNT PAIN NOTED NONE PRSNT: CPT | Performed by: INTERNAL MEDICINE

## 2025-08-20 PROCEDURE — 1160F RVW MEDS BY RX/DR IN RCRD: CPT | Performed by: INTERNAL MEDICINE

## 2025-08-20 PROCEDURE — G8427 DOCREV CUR MEDS BY ELIG CLIN: HCPCS | Performed by: INTERNAL MEDICINE

## 2025-08-20 PROCEDURE — 1090F PRES/ABSN URINE INCON ASSESS: CPT | Performed by: INTERNAL MEDICINE

## 2025-08-20 PROCEDURE — 1123F ACP DISCUSS/DSCN MKR DOCD: CPT | Performed by: INTERNAL MEDICINE

## 2025-08-20 PROCEDURE — G8420 CALC BMI NORM PARAMETERS: HCPCS | Performed by: INTERNAL MEDICINE

## 2025-08-20 ASSESSMENT — PATIENT HEALTH QUESTIONNAIRE - PHQ9
2. FEELING DOWN, DEPRESSED OR HOPELESS: NOT AT ALL
1. LITTLE INTEREST OR PLEASURE IN DOING THINGS: NOT AT ALL
SUM OF ALL RESPONSES TO PHQ QUESTIONS 1-9: 0